# Patient Record
Sex: MALE | Race: WHITE | URBAN - METROPOLITAN AREA
[De-identification: names, ages, dates, MRNs, and addresses within clinical notes are randomized per-mention and may not be internally consistent; named-entity substitution may affect disease eponyms.]

---

## 2017-01-16 ENCOUNTER — HISTORICAL (OUTPATIENT)
Dept: ADMINISTRATIVE | Facility: HOSPITAL | Age: 62
End: 2017-01-16

## 2019-02-21 ENCOUNTER — HISTORICAL (OUTPATIENT)
Dept: ADMINISTRATIVE | Facility: HOSPITAL | Age: 64
End: 2019-02-21

## 2019-02-21 LAB
ALBUMIN SERPL-MCNC: 4.2 GM/DL (ref 3.4–5)
ALP SERPL-CCNC: 60 UNIT/L (ref 50–136)
ALT SERPL-CCNC: 35 UNIT/L (ref 12–78)
AST SERPL-CCNC: 23 UNIT/L (ref 15–37)
BILIRUB SERPL-MCNC: 0.5 MG/DL (ref 0.2–1)
BILIRUBIN DIRECT+TOT PNL SERPL-MCNC: 0.2 MG/DL (ref 0–0.2)
BILIRUBIN DIRECT+TOT PNL SERPL-MCNC: 0.3 MG/DL (ref 0–0.8)
CHOLEST SERPL-MCNC: 171 MG/DL (ref 0–200)
CHOLEST/HDLC SERPL: 4.5 {RATIO} (ref 0–5)
HDLC SERPL-MCNC: 38 MG/DL (ref 35–60)
LDLC SERPL CALC-MCNC: 100 MG/DL (ref 0–129)
LIVER PROFILE INTERP: NORMAL
PROT SERPL-MCNC: 7.4 GM/DL (ref 6.4–8.2)
TRIGL SERPL-MCNC: 165 MG/DL (ref 30–150)
VLDLC SERPL CALC-MCNC: 33 MG/DL

## 2025-07-25 ENCOUNTER — ANESTHESIA EVENT (OUTPATIENT)
Dept: SURGERY | Facility: HOSPITAL | Age: 70
End: 2025-07-25
Payer: MEDICARE

## 2025-07-25 ENCOUNTER — ANESTHESIA (OUTPATIENT)
Dept: SURGERY | Facility: HOSPITAL | Age: 70
End: 2025-07-25
Payer: MEDICARE

## 2025-07-25 ENCOUNTER — HOSPITAL ENCOUNTER (INPATIENT)
Facility: HOSPITAL | Age: 70
LOS: 6 days | Discharge: NURSING, REHAB OR LONG-TERM CARE | DRG: 481 | End: 2025-07-31
Attending: EMERGENCY MEDICINE | Admitting: INTERNAL MEDICINE
Payer: MEDICARE

## 2025-07-25 DIAGNOSIS — R07.9 CHEST PAIN: ICD-10-CM

## 2025-07-25 DIAGNOSIS — S72.142A INTERTROCHANTERIC FRACTURE OF LEFT FEMUR, CLOSED, INITIAL ENCOUNTER: Primary | ICD-10-CM

## 2025-07-25 DIAGNOSIS — Z01.818 PRE-OP EVALUATION: ICD-10-CM

## 2025-07-25 PROBLEM — D64.9 ANEMIA: Status: ACTIVE | Noted: 2025-07-25

## 2025-07-25 LAB
ALBUMIN SERPL-MCNC: 3.7 G/DL (ref 3.4–4.8)
ALBUMIN/GLOB SERPL: 1.2 RATIO (ref 1.1–2)
ALP SERPL-CCNC: 73 UNIT/L (ref 40–150)
ALT SERPL-CCNC: 21 UNIT/L (ref 0–55)
ANION GAP SERPL CALC-SCNC: 9 MEQ/L
APTT PPP: 24.8 SECONDS (ref 23.2–33.7)
AST SERPL-CCNC: 20 UNIT/L (ref 11–45)
BASOPHILS # BLD AUTO: 0.07 X10(3)/MCL
BASOPHILS NFR BLD AUTO: 0.4 %
BILIRUB SERPL-MCNC: 0.6 MG/DL
BUN SERPL-MCNC: 16 MG/DL (ref 8.4–25.7)
CALCIUM SERPL-MCNC: 8.6 MG/DL (ref 8.8–10)
CHLORIDE SERPL-SCNC: 102 MMOL/L (ref 98–107)
CO2 SERPL-SCNC: 26 MMOL/L (ref 23–31)
CREAT SERPL-MCNC: 0.89 MG/DL (ref 0.72–1.25)
CREAT/UREA NIT SERPL: 18
EOSINOPHIL # BLD AUTO: 0.25 X10(3)/MCL (ref 0–0.9)
EOSINOPHIL NFR BLD AUTO: 1.4 %
ERYTHROCYTE [DISTWIDTH] IN BLOOD BY AUTOMATED COUNT: 12.9 % (ref 11.5–17)
GFR SERPLBLD CREATININE-BSD FMLA CKD-EPI: >60 ML/MIN/1.73/M2
GLOBULIN SER-MCNC: 3.2 GM/DL (ref 2.4–3.5)
GLUCOSE SERPL-MCNC: 122 MG/DL (ref 82–115)
GROUP & RH: NORMAL
HCT VFR BLD AUTO: 42.6 % (ref 42–52)
HGB BLD-MCNC: 13.9 G/DL (ref 14–18)
IMM GRANULOCYTES # BLD AUTO: 0.11 X10(3)/MCL (ref 0–0.04)
IMM GRANULOCYTES NFR BLD AUTO: 0.6 %
INDIRECT COOMBS: NORMAL
INR PPP: 1
LYMPHOCYTES # BLD AUTO: 1.63 X10(3)/MCL (ref 0.6–4.6)
LYMPHOCYTES NFR BLD AUTO: 9.4 %
MCH RBC QN AUTO: 30.7 PG (ref 27–31)
MCHC RBC AUTO-ENTMCNC: 32.6 G/DL (ref 33–36)
MCV RBC AUTO: 94 FL (ref 80–94)
MONOCYTES # BLD AUTO: 0.59 X10(3)/MCL (ref 0.1–1.3)
MONOCYTES NFR BLD AUTO: 3.4 %
NEUTROPHILS # BLD AUTO: 14.74 X10(3)/MCL (ref 2.1–9.2)
NEUTROPHILS NFR BLD AUTO: 84.8 %
NRBC BLD AUTO-RTO: 0 %
OHS QRS DURATION: 88 MS
OHS QTC CALCULATION: 373 MS
PLATELET # BLD AUTO: 206 X10(3)/MCL (ref 130–400)
PMV BLD AUTO: 10.3 FL (ref 7.4–10.4)
POTASSIUM SERPL-SCNC: 4.4 MMOL/L (ref 3.5–5.1)
PROT SERPL-MCNC: 6.9 GM/DL (ref 5.8–7.6)
PROTHROMBIN TIME: 13.4 SECONDS (ref 12.5–14.5)
RBC # BLD AUTO: 4.53 X10(6)/MCL (ref 4.7–6.1)
SODIUM SERPL-SCNC: 137 MMOL/L (ref 136–145)
SPECIMEN OUTDATE: NORMAL
WBC # BLD AUTO: 17.39 X10(3)/MCL (ref 4.5–11.5)

## 2025-07-25 PROCEDURE — 37000009 HC ANESTHESIA EA ADD 15 MINS: Performed by: ORTHOPAEDIC SURGERY

## 2025-07-25 PROCEDURE — 27201423 OPTIME MED/SURG SUP & DEVICES STERILE SUPPLY: Performed by: ORTHOPAEDIC SURGERY

## 2025-07-25 PROCEDURE — 63600175 PHARM REV CODE 636 W HCPCS: Performed by: STUDENT IN AN ORGANIZED HEALTH CARE EDUCATION/TRAINING PROGRAM

## 2025-07-25 PROCEDURE — 25000003 PHARM REV CODE 250: Performed by: EMERGENCY MEDICINE

## 2025-07-25 PROCEDURE — 71000039 HC RECOVERY, EACH ADD'L HOUR: Performed by: ORTHOPAEDIC SURGERY

## 2025-07-25 PROCEDURE — 36000711: Performed by: ORTHOPAEDIC SURGERY

## 2025-07-25 PROCEDURE — 86900 BLOOD TYPING SEROLOGIC ABO: CPT | Performed by: ORTHOPAEDIC SURGERY

## 2025-07-25 PROCEDURE — 63600175 PHARM REV CODE 636 W HCPCS: Performed by: ORTHOPAEDIC SURGERY

## 2025-07-25 PROCEDURE — 27245 TREAT THIGH FRACTURE: CPT | Mod: LT,,, | Performed by: ORTHOPAEDIC SURGERY

## 2025-07-25 PROCEDURE — 85610 PROTHROMBIN TIME: CPT | Performed by: EMERGENCY MEDICINE

## 2025-07-25 PROCEDURE — 36000710: Performed by: ORTHOPAEDIC SURGERY

## 2025-07-25 PROCEDURE — 71000015 HC POSTOP RECOV 1ST HR: Performed by: ORTHOPAEDIC SURGERY

## 2025-07-25 PROCEDURE — 96375 TX/PRO/DX INJ NEW DRUG ADDON: CPT

## 2025-07-25 PROCEDURE — 0QS706Z REPOSITION LEFT UPPER FEMUR WITH INTRAMEDULLARY INTERNAL FIXATION DEVICE, OPEN APPROACH: ICD-10-PCS | Performed by: ORTHOPAEDIC SURGERY

## 2025-07-25 PROCEDURE — 99285 EMERGENCY DEPT VISIT HI MDM: CPT | Mod: 25

## 2025-07-25 PROCEDURE — 63600175 PHARM REV CODE 636 W HCPCS: Performed by: EMERGENCY MEDICINE

## 2025-07-25 PROCEDURE — 37000008 HC ANESTHESIA 1ST 15 MINUTES: Performed by: ORTHOPAEDIC SURGERY

## 2025-07-25 PROCEDURE — 11000001 HC ACUTE MED/SURG PRIVATE ROOM

## 2025-07-25 PROCEDURE — 63600175 PHARM REV CODE 636 W HCPCS

## 2025-07-25 PROCEDURE — 93010 ELECTROCARDIOGRAM REPORT: CPT | Mod: ,,, | Performed by: INTERNAL MEDICINE

## 2025-07-25 PROCEDURE — 93005 ELECTROCARDIOGRAM TRACING: CPT

## 2025-07-25 PROCEDURE — C1769 GUIDE WIRE: HCPCS | Performed by: ORTHOPAEDIC SURGERY

## 2025-07-25 PROCEDURE — 80053 COMPREHEN METABOLIC PANEL: CPT | Performed by: EMERGENCY MEDICINE

## 2025-07-25 PROCEDURE — 25000003 PHARM REV CODE 250: Performed by: PHYSICIAN ASSISTANT

## 2025-07-25 PROCEDURE — C1713 ANCHOR/SCREW BN/BN,TIS/BN: HCPCS | Performed by: ORTHOPAEDIC SURGERY

## 2025-07-25 PROCEDURE — 63600175 PHARM REV CODE 636 W HCPCS: Performed by: ANESTHESIOLOGY

## 2025-07-25 PROCEDURE — 71000016 HC POSTOP RECOV ADDL HR: Performed by: ORTHOPAEDIC SURGERY

## 2025-07-25 PROCEDURE — 96374 THER/PROPH/DIAG INJ IV PUSH: CPT

## 2025-07-25 PROCEDURE — 85730 THROMBOPLASTIN TIME PARTIAL: CPT | Performed by: EMERGENCY MEDICINE

## 2025-07-25 PROCEDURE — 25000003 PHARM REV CODE 250: Performed by: STUDENT IN AN ORGANIZED HEALTH CARE EDUCATION/TRAINING PROGRAM

## 2025-07-25 PROCEDURE — 25000003 PHARM REV CODE 250

## 2025-07-25 PROCEDURE — 96361 HYDRATE IV INFUSION ADD-ON: CPT

## 2025-07-25 PROCEDURE — 63600175 PHARM REV CODE 636 W HCPCS: Mod: JZ,TB | Performed by: ANESTHESIOLOGY

## 2025-07-25 PROCEDURE — 63600175 PHARM REV CODE 636 W HCPCS: Performed by: PHYSICIAN ASSISTANT

## 2025-07-25 PROCEDURE — 27245 TREAT THIGH FRACTURE: CPT | Mod: AS,LT,, | Performed by: PHYSICIAN ASSISTANT

## 2025-07-25 PROCEDURE — 71000033 HC RECOVERY, INTIAL HOUR: Performed by: ORTHOPAEDIC SURGERY

## 2025-07-25 PROCEDURE — 99223 1ST HOSP IP/OBS HIGH 75: CPT | Mod: 57,,, | Performed by: ORTHOPAEDIC SURGERY

## 2025-07-25 PROCEDURE — 85025 COMPLETE CBC W/AUTO DIFF WBC: CPT | Performed by: EMERGENCY MEDICINE

## 2025-07-25 DEVICE — IMPLANTABLE DEVICE: Type: IMPLANTABLE DEVICE | Site: HIP | Status: FUNCTIONAL

## 2025-07-25 DEVICE — NAIL IM CANN 130 DEG 11X400 L: Type: IMPLANTABLE DEVICE | Site: HIP | Status: FUNCTIONAL

## 2025-07-25 DEVICE — TFNA FENESTRATED HELICAL BLADE 95MM - STERILE
Type: IMPLANTABLE DEVICE | Site: HIP | Status: FUNCTIONAL
Brand: TFN-ADVANCE

## 2025-07-25 RX ORDER — MORPHINE SULFATE 4 MG/ML
2 INJECTION, SOLUTION INTRAMUSCULAR; INTRAVENOUS EVERY 6 HOURS PRN
Refills: 0 | Status: DISCONTINUED | OUTPATIENT
Start: 2025-07-25 | End: 2025-07-25 | Stop reason: SDUPTHER

## 2025-07-25 RX ORDER — PROPOFOL 10 MG/ML
VIAL (ML) INTRAVENOUS
Status: DISCONTINUED | OUTPATIENT
Start: 2025-07-25 | End: 2025-07-25

## 2025-07-25 RX ORDER — PRAVASTATIN SODIUM 20 MG/1
TABLET ORAL
Status: ON HOLD | COMMUNITY

## 2025-07-25 RX ORDER — POLYETHYLENE GLYCOL 3350 17 G/17G
17 POWDER, FOR SOLUTION ORAL 3 TIMES DAILY PRN
Status: DISCONTINUED | OUTPATIENT
Start: 2025-07-25 | End: 2025-07-31 | Stop reason: HOSPADM

## 2025-07-25 RX ORDER — CEFAZOLIN SODIUM 1 G/3ML
INJECTION, POWDER, FOR SOLUTION INTRAMUSCULAR; INTRAVENOUS
Status: DISCONTINUED | OUTPATIENT
Start: 2025-07-25 | End: 2025-07-25

## 2025-07-25 RX ORDER — LOSARTAN POTASSIUM 50 MG/1
50 TABLET ORAL
Status: ON HOLD | COMMUNITY
Start: 2025-04-27 | End: 2025-07-30 | Stop reason: HOSPADM

## 2025-07-25 RX ORDER — IBUPROFEN 200 MG
1 TABLET ORAL DAILY PRN
Status: DISCONTINUED | OUTPATIENT
Start: 2025-07-25 | End: 2025-07-31 | Stop reason: HOSPADM

## 2025-07-25 RX ORDER — PRAVASTATIN SODIUM 10 MG/1
20 TABLET ORAL DAILY
Status: DISCONTINUED | OUTPATIENT
Start: 2025-07-25 | End: 2025-07-31 | Stop reason: HOSPADM

## 2025-07-25 RX ORDER — MORPHINE SULFATE 4 MG/ML
4 INJECTION, SOLUTION INTRAMUSCULAR; INTRAVENOUS EVERY 6 HOURS PRN
Refills: 0 | Status: DISCONTINUED | OUTPATIENT
Start: 2025-07-25 | End: 2025-07-30

## 2025-07-25 RX ORDER — VANCOMYCIN HYDROCHLORIDE 1 G/20ML
INJECTION, POWDER, LYOPHILIZED, FOR SOLUTION INTRAVENOUS
Status: DISCONTINUED | OUTPATIENT
Start: 2025-07-25 | End: 2025-07-25 | Stop reason: HOSPADM

## 2025-07-25 RX ORDER — ONDANSETRON HYDROCHLORIDE 2 MG/ML
4 INJECTION, SOLUTION INTRAVENOUS EVERY 8 HOURS PRN
Status: DISCONTINUED | OUTPATIENT
Start: 2025-07-25 | End: 2025-07-25

## 2025-07-25 RX ORDER — NALOXONE HCL 0.4 MG/ML
0.02 VIAL (ML) INJECTION
Status: DISCONTINUED | OUTPATIENT
Start: 2025-07-25 | End: 2025-07-31 | Stop reason: HOSPADM

## 2025-07-25 RX ORDER — KETOROLAC TROMETHAMINE 30 MG/ML
15 INJECTION, SOLUTION INTRAMUSCULAR; INTRAVENOUS EVERY 8 HOURS PRN
Status: DISCONTINUED | OUTPATIENT
Start: 2025-07-25 | End: 2025-07-25

## 2025-07-25 RX ORDER — ENOXAPARIN SODIUM 100 MG/ML
40 INJECTION SUBCUTANEOUS EVERY 24 HOURS
Status: DISCONTINUED | OUTPATIENT
Start: 2025-07-25 | End: 2025-07-31 | Stop reason: HOSPADM

## 2025-07-25 RX ORDER — LIDOCAINE HYDROCHLORIDE 20 MG/ML
INJECTION, SOLUTION EPIDURAL; INFILTRATION; INTRACAUDAL; PERINEURAL
Status: DISCONTINUED | OUTPATIENT
Start: 2025-07-25 | End: 2025-07-25

## 2025-07-25 RX ORDER — ACETAMINOPHEN 325 MG/1
650 TABLET ORAL EVERY 4 HOURS PRN
Status: DISCONTINUED | OUTPATIENT
Start: 2025-07-25 | End: 2025-07-31 | Stop reason: HOSPADM

## 2025-07-25 RX ORDER — TALC
9 POWDER (GRAM) TOPICAL NIGHTLY PRN
Status: DISCONTINUED | OUTPATIENT
Start: 2025-07-25 | End: 2025-07-31 | Stop reason: HOSPADM

## 2025-07-25 RX ORDER — ACETAMINOPHEN 500 MG
1000 TABLET ORAL EVERY 6 HOURS PRN
Status: DISCONTINUED | OUTPATIENT
Start: 2025-07-25 | End: 2025-07-25

## 2025-07-25 RX ORDER — TIZANIDINE 4 MG/1
4 TABLET ORAL EVERY 8 HOURS PRN
Status: DISCONTINUED | OUTPATIENT
Start: 2025-07-25 | End: 2025-07-30

## 2025-07-25 RX ORDER — ONDANSETRON HYDROCHLORIDE 2 MG/ML
4 INJECTION, SOLUTION INTRAVENOUS EVERY 8 HOURS PRN
Status: DISCONTINUED | OUTPATIENT
Start: 2025-07-25 | End: 2025-07-31 | Stop reason: HOSPADM

## 2025-07-25 RX ORDER — CEFAZOLIN SODIUM 2 G/50ML
2 SOLUTION INTRAVENOUS
Status: DISCONTINUED | OUTPATIENT
Start: 2025-07-25 | End: 2025-07-25 | Stop reason: HOSPADM

## 2025-07-25 RX ORDER — HYDRALAZINE HYDROCHLORIDE 20 MG/ML
10 INJECTION INTRAMUSCULAR; INTRAVENOUS ONCE
Status: DISCONTINUED | OUTPATIENT
Start: 2025-07-25 | End: 2025-07-25

## 2025-07-25 RX ORDER — DEXAMETHASONE SODIUM PHOSPHATE 4 MG/ML
INJECTION, SOLUTION INTRA-ARTICULAR; INTRALESIONAL; INTRAMUSCULAR; INTRAVENOUS; SOFT TISSUE
Status: DISCONTINUED | OUTPATIENT
Start: 2025-07-25 | End: 2025-07-25

## 2025-07-25 RX ORDER — HALOPERIDOL LACTATE 5 MG/ML
0.5 INJECTION, SOLUTION INTRAMUSCULAR EVERY 10 MIN PRN
Status: DISCONTINUED | OUTPATIENT
Start: 2025-07-25 | End: 2025-07-25

## 2025-07-25 RX ORDER — ROCURONIUM BROMIDE 10 MG/ML
INJECTION, SOLUTION INTRAVENOUS
Status: DISCONTINUED | OUTPATIENT
Start: 2025-07-25 | End: 2025-07-25

## 2025-07-25 RX ORDER — IBUPROFEN 200 MG
16 TABLET ORAL
Status: DISCONTINUED | OUTPATIENT
Start: 2025-07-25 | End: 2025-07-31 | Stop reason: HOSPADM

## 2025-07-25 RX ORDER — SODIUM CHLORIDE 0.9 % (FLUSH) 0.9 %
10 SYRINGE (ML) INJECTION
Status: DISCONTINUED | OUTPATIENT
Start: 2025-07-25 | End: 2025-07-25

## 2025-07-25 RX ORDER — FENTANYL CITRATE 50 UG/ML
INJECTION, SOLUTION INTRAMUSCULAR; INTRAVENOUS
Status: DISCONTINUED | OUTPATIENT
Start: 2025-07-25 | End: 2025-07-25

## 2025-07-25 RX ORDER — HYDROMORPHONE HYDROCHLORIDE 2 MG/ML
INJECTION, SOLUTION INTRAMUSCULAR; INTRAVENOUS; SUBCUTANEOUS
Status: DISCONTINUED | OUTPATIENT
Start: 2025-07-25 | End: 2025-07-25

## 2025-07-25 RX ORDER — IBUPROFEN 100 MG/5ML
SUSPENSION, ORAL (FINAL DOSE FORM) ORAL
Status: ON HOLD | COMMUNITY

## 2025-07-25 RX ORDER — SIMETHICONE 80 MG
1 TABLET,CHEWABLE ORAL 4 TIMES DAILY PRN
Status: DISCONTINUED | OUTPATIENT
Start: 2025-07-25 | End: 2025-07-31 | Stop reason: HOSPADM

## 2025-07-25 RX ORDER — ONDANSETRON HYDROCHLORIDE 2 MG/ML
INJECTION, SOLUTION INTRAVENOUS
Status: DISCONTINUED | OUTPATIENT
Start: 2025-07-25 | End: 2025-07-25

## 2025-07-25 RX ORDER — SODIUM CHLORIDE 0.9 % (FLUSH) 0.9 %
10 SYRINGE (ML) INJECTION
Status: DISCONTINUED | OUTPATIENT
Start: 2025-07-25 | End: 2025-07-31 | Stop reason: HOSPADM

## 2025-07-25 RX ORDER — IPRATROPIUM BROMIDE AND ALBUTEROL SULFATE 2.5; .5 MG/3ML; MG/3ML
3 SOLUTION RESPIRATORY (INHALATION) EVERY 6 HOURS PRN
Status: DISCONTINUED | OUTPATIENT
Start: 2025-07-25 | End: 2025-07-31 | Stop reason: HOSPADM

## 2025-07-25 RX ORDER — ACETAMINOPHEN 10 MG/ML
1000 INJECTION, SOLUTION INTRAVENOUS ONCE
Status: COMPLETED | OUTPATIENT
Start: 2025-07-25 | End: 2025-07-25

## 2025-07-25 RX ORDER — GLUCAGON 1 MG
1 KIT INJECTION
Status: DISCONTINUED | OUTPATIENT
Start: 2025-07-25 | End: 2025-07-31 | Stop reason: HOSPADM

## 2025-07-25 RX ORDER — BISACODYL 10 MG/1
10 SUPPOSITORY RECTAL DAILY PRN
Status: DISCONTINUED | OUTPATIENT
Start: 2025-07-25 | End: 2025-07-31 | Stop reason: HOSPADM

## 2025-07-25 RX ORDER — CEFAZOLIN 2 G/1
2 INJECTION, POWDER, FOR SOLUTION INTRAMUSCULAR; INTRAVENOUS
Status: COMPLETED | OUTPATIENT
Start: 2025-07-25 | End: 2025-07-26

## 2025-07-25 RX ORDER — GLUCAGON 1 MG
1 KIT INJECTION
Status: DISCONTINUED | OUTPATIENT
Start: 2025-07-25 | End: 2025-07-25

## 2025-07-25 RX ORDER — HYDROMORPHONE HYDROCHLORIDE 2 MG/ML
0.2 INJECTION, SOLUTION INTRAMUSCULAR; INTRAVENOUS; SUBCUTANEOUS EVERY 5 MIN PRN
Status: DISCONTINUED | OUTPATIENT
Start: 2025-07-25 | End: 2025-07-25

## 2025-07-25 RX ORDER — LOSARTAN POTASSIUM 50 MG/1
50 TABLET ORAL DAILY
Status: DISCONTINUED | OUTPATIENT
Start: 2025-07-25 | End: 2025-07-30

## 2025-07-25 RX ORDER — ALUMINUM HYDROXIDE, MAGNESIUM HYDROXIDE, AND SIMETHICONE 1200; 120; 1200 MG/30ML; MG/30ML; MG/30ML
30 SUSPENSION ORAL 4 TIMES DAILY PRN
Status: DISCONTINUED | OUTPATIENT
Start: 2025-07-25 | End: 2025-07-31 | Stop reason: HOSPADM

## 2025-07-25 RX ORDER — ONDANSETRON 4 MG/1
8 TABLET, ORALLY DISINTEGRATING ORAL EVERY 8 HOURS PRN
Status: DISCONTINUED | OUTPATIENT
Start: 2025-07-25 | End: 2025-07-31 | Stop reason: HOSPADM

## 2025-07-25 RX ORDER — GLUCOSAM/CHONDRO/HERB 149/HYAL 750-100 MG
1 TABLET ORAL
Status: ON HOLD | COMMUNITY

## 2025-07-25 RX ORDER — IBUPROFEN 200 MG
24 TABLET ORAL
Status: DISCONTINUED | OUTPATIENT
Start: 2025-07-25 | End: 2025-07-31 | Stop reason: HOSPADM

## 2025-07-25 RX ORDER — OXYCODONE AND ACETAMINOPHEN 5; 325 MG/1; MG/1
1 TABLET ORAL
Status: DISCONTINUED | OUTPATIENT
Start: 2025-07-25 | End: 2025-07-25

## 2025-07-25 RX ORDER — HYDROCODONE BITARTRATE AND ACETAMINOPHEN 5; 325 MG/1; MG/1
1 TABLET ORAL EVERY 6 HOURS PRN
Refills: 0 | Status: DISCONTINUED | OUTPATIENT
Start: 2025-07-25 | End: 2025-07-25 | Stop reason: SDUPTHER

## 2025-07-25 RX ORDER — OXYCODONE HYDROCHLORIDE 10 MG/1
10 TABLET ORAL EVERY 4 HOURS PRN
Refills: 0 | Status: DISCONTINUED | OUTPATIENT
Start: 2025-07-25 | End: 2025-07-30

## 2025-07-25 RX ORDER — SODIUM CHLORIDE 9 MG/ML
500 INJECTION, SOLUTION INTRAVENOUS
Status: COMPLETED | OUTPATIENT
Start: 2025-07-25 | End: 2025-07-25

## 2025-07-25 RX ORDER — METHOCARBAMOL 100 MG/ML
1000 INJECTION, SOLUTION INTRAMUSCULAR; INTRAVENOUS ONCE
Status: COMPLETED | OUTPATIENT
Start: 2025-07-25 | End: 2025-07-25

## 2025-07-25 RX ORDER — OXYCODONE HYDROCHLORIDE 5 MG/1
5 TABLET ORAL EVERY 6 HOURS PRN
Refills: 0 | Status: DISCONTINUED | OUTPATIENT
Start: 2025-07-25 | End: 2025-07-26

## 2025-07-25 RX ORDER — MORPHINE SULFATE 4 MG/ML
4 INJECTION, SOLUTION INTRAMUSCULAR; INTRAVENOUS
Status: COMPLETED | OUTPATIENT
Start: 2025-07-25 | End: 2025-07-25

## 2025-07-25 RX ORDER — GLYCOPYRROLATE 0.2 MG/ML
INJECTION INTRAMUSCULAR; INTRAVENOUS
Status: DISCONTINUED | OUTPATIENT
Start: 2025-07-25 | End: 2025-07-25

## 2025-07-25 RX ADMIN — OXYCODONE HYDROCHLORIDE 5 MG: 5 TABLET ORAL at 06:07

## 2025-07-25 RX ADMIN — SODIUM CHLORIDE 500 ML: 9 INJECTION, SOLUTION INTRAVENOUS at 04:07

## 2025-07-25 RX ADMIN — ROCURONIUM BROMIDE 50 MG: 10 SOLUTION INTRAVENOUS at 11:07

## 2025-07-25 RX ADMIN — METHOCARBAMOL 1000 MG: 100 INJECTION INTRAMUSCULAR; INTRAVENOUS at 04:07

## 2025-07-25 RX ADMIN — LIDOCAINE HYDROCHLORIDE 80 MG: 20 INJECTION, SOLUTION EPIDURAL; INFILTRATION; INTRACAUDAL; PERINEURAL at 11:07

## 2025-07-25 RX ADMIN — OXYCODONE HYDROCHLORIDE 10 MG: 10 TABLET ORAL at 10:07

## 2025-07-25 RX ADMIN — HYDROCODONE BITARTRATE AND ACETAMINOPHEN 1 TABLET: 5; 325 TABLET ORAL at 07:07

## 2025-07-25 RX ADMIN — HYDROMORPHONE HYDROCHLORIDE 1 MG: 2 INJECTION, SOLUTION INTRAMUSCULAR; INTRAVENOUS; SUBCUTANEOUS at 12:07

## 2025-07-25 RX ADMIN — CEFAZOLIN 2 G: 330 INJECTION, POWDER, FOR SOLUTION INTRAMUSCULAR; INTRAVENOUS at 11:07

## 2025-07-25 RX ADMIN — ACETAMINOPHEN 1000 MG: 10 INJECTION, SOLUTION INTRAVENOUS at 01:07

## 2025-07-25 RX ADMIN — MORPHINE SULFATE 4 MG: 4 INJECTION INTRAVENOUS at 04:07

## 2025-07-25 RX ADMIN — SUGAMMADEX 200 MG: 100 INJECTION, SOLUTION INTRAVENOUS at 12:07

## 2025-07-25 RX ADMIN — FENTANYL CITRATE 100 MCG: 50 INJECTION, SOLUTION INTRAMUSCULAR; INTRAVENOUS at 11:07

## 2025-07-25 RX ADMIN — CEFAZOLIN 2 G: 2 INJECTION, POWDER, FOR SOLUTION INTRAMUSCULAR; INTRAVENOUS at 06:07

## 2025-07-25 RX ADMIN — PROPOFOL 150 MG: 10 INJECTION, EMULSION INTRAVENOUS at 11:07

## 2025-07-25 RX ADMIN — ENOXAPARIN SODIUM 40 MG: 40 INJECTION SUBCUTANEOUS at 04:07

## 2025-07-25 RX ADMIN — KETOROLAC TROMETHAMINE 15 MG: 30 INJECTION, SOLUTION INTRAMUSCULAR at 01:07

## 2025-07-25 RX ADMIN — GLYCOPYRROLATE 0.1 MG: 0.2 INJECTION INTRAMUSCULAR; INTRAVENOUS at 11:07

## 2025-07-25 RX ADMIN — SODIUM CHLORIDE 500 ML: 9 INJECTION, SOLUTION INTRAVENOUS at 05:07

## 2025-07-25 RX ADMIN — HYDROMORPHONE HYDROCHLORIDE 1 MG: 2 INJECTION, SOLUTION INTRAMUSCULAR; INTRAVENOUS; SUBCUTANEOUS at 11:07

## 2025-07-25 RX ADMIN — ONDANSETRON 4 MG: 2 INJECTION INTRAMUSCULAR; INTRAVENOUS at 11:07

## 2025-07-25 RX ADMIN — DEXAMETHASONE SODIUM PHOSPHATE 8 MG: 4 INJECTION, SOLUTION INTRA-ARTICULAR; INTRALESIONAL; INTRAMUSCULAR; INTRAVENOUS; SOFT TISSUE at 11:07

## 2025-07-25 RX ADMIN — SODIUM CHLORIDE, SODIUM GLUCONATE, SODIUM ACETATE, POTASSIUM CHLORIDE AND MAGNESIUM CHLORIDE: 526; 502; 368; 37; 30 INJECTION, SOLUTION INTRAVENOUS at 11:07

## 2025-07-25 NOTE — ANESTHESIA PREPROCEDURE EVALUATION
"                                                                                                             07/25/2025  Panfilo Tolentino is a 69 y.o., male w/ h/o HTN and HLD presenting to [facility] today with a L intertrochanteric proximal femur fracture, to be repaired by Dr. Herring.         Height: 6' 2" (1.88 m) (07/25/25)   Weight: 98.9 kg (218 lb) (07/25/25)   BMI: 28 (07/25/25)   NPO Status: Not recorded   Allergies: No Known Allergies     Pre Vitals  Current as of 07/25/25 0809  BP: Pulse:   Resp: 23 SpO2:   Temp:     Past Medical History   Hypertension Mixed hyperlipidemia     Surgical History    VASCULAR SURGERY     Prescription Medications  Within last 14 days from 07/25/25   Last Taken Last Updated   ascorbic acid, vitamin C, (VITAMIN C) 1000 MG tablet        losartan (COZAAR) 50 MG tablet    7/24/2025 07/25/25 0553   mv-min-folic-K1-lycopen-lutein 756--300 mcg Tab        omega 3-dha-epa-fish oil (FISH OIL) 1,000 (120-180) mg Cap        pravastatin (PRAVACHOL) 20 MG tablet    7/24/2025 07/25/25 0553      Latest Reference Range & Units 07/25/25 05:02   WBC 4.50 - 11.50 x10(3)/mcL 17.39 (H)   RBC 4.70 - 6.10 x10(6)/mcL 4.53 (L)   Hemoglobin 14.0 - 18.0 g/dL 13.9 (L)   Hematocrit 42.0 - 52.0 % 42.6   Platelet Count 130 - 400 x10(3)/mcL 206   PT 12.5 - 14.5 seconds 13.4   INR <=1.3  1.0   PTT 23.2 - 33.7 seconds 24.8   Sodium 136 - 145 mmol/L 137   Potassium 3.5 - 5.1 mmol/L 4.4   Chloride 98 - 107 mmol/L 102   CO2 23 - 31 mmol/L 26   Anion Gap mEq/L 9.0   BUN 8.4 - 25.7 mg/dL 16.0   Creatinine 0.72 - 1.25 mg/dL 0.89   BUN/CREAT RATIO  18   eGFR mL/min/1.73/m2 >60   Glucose 82 - 115 mg/dL 122 (H)   Calcium 8.8 - 10.0 mg/dL 8.6 (L)       Pre-op Assessment    I have reviewed the Patient Summary Reports.     I have reviewed the Nursing Notes. I have reviewed the NPO Status.   I have reviewed the Medications.     Review of Systems  Anesthesia Hx:  No problems with previous Anesthesia   History of prior " surgery of interest to airway management or planning:  Previous anesthesia: General, MAC        Denies Family Hx of Anesthesia complications.    Denies Personal Hx of Anesthesia complications.                    Social:  Smoker, Alcohol Use       Hematology/Oncology:    Oncology Normal    -- Denies Anemia:                                  EENT/Dental:  EENT/Dental Normal           Cardiovascular:  Exercise tolerance: good   Hypertension       Denies Angina.                              Hypertension         Pulmonary:     Denies Asthma.   Denies Shortness of breath.   Denies Sleep Apnea.                Renal/:   Denies Chronic Renal Disease.                Hepatic/GI:      Denies GERD.    Not Taking GLP-1 Agonists            Musculoskeletal:  Musculoskeletal Normal                Neurological:    Denies CVA.                                    Endocrine:  Denies Diabetes.         Denies Obesity / BMI > 30  Dermatological:  Skin Normal    Psych:  Psychiatric Normal                    Physical Exam  General: Well nourished, Cooperative, Alert and Oriented    Airway:  Mallampati: II / I  Mouth Opening: Normal  TM Distance: Normal  Tongue: Normal  Neck ROM: Normal ROM    Dental:  Intact    Chest/Lungs:  Clear to auscultation, Normal Respiratory Rate    Heart:  Rate: Normal  Rhythm: Regular Rhythm  Sounds: Normal    Abdomen:  Normal, Soft, Nontender        Anesthesia Plan  Type of Anesthesia, risks & benefits discussed:    Anesthesia Type: Gen ETT  Intra-op Monitoring Plan: Standard ASA Monitors  Post Op Pain Control Plan: multimodal analgesia and IV/PO Opioids PRN  Induction:  IV  Airway Plan: Direct, Post-Induction  Informed Consent: Informed consent signed with the Patient and all parties understand the risks and agree with anesthesia plan.  All questions answered. Patient consented to blood products? Yes  ASA Score: 2 Emergent  Day of Surgery Review of History & Physical: H&P Update referred to the  surgeon/provider.    Ready For Surgery From Anesthesia Perspective.     .

## 2025-07-25 NOTE — TRANSFER OF CARE
"Anesthesia Transfer of Care Note    Patient: Panfilo Tolentino    Procedure(s) Performed: Procedure(s) (LRB):  INSERTION, INTRAMEDULLARY IMPLANT, FEMUR (Left)    Patient location: PACU    Anesthesia Type: general    Transport from OR: Transported from OR on room air with adequate spontaneous ventilation    Post pain: adequate analgesia    Post assessment: no apparent anesthetic complications    Post vital signs: stable    Level of consciousness: responds to stimulation    Nausea/Vomiting: no nausea/vomiting    Complications: none    Transfer of care protocol was followed      Last vitals: Visit Vitals  BP (!) 150/79 (BP Location: Right arm, Patient Position: Lying)   Pulse 66   Temp 36.7 °C (98 °F) (Oral)   Resp 18   Ht 6' 2" (1.88 m)   Wt 98.9 kg (218 lb)   SpO2 97%   BMI 27.99 kg/m²     "

## 2025-07-25 NOTE — ED PROVIDER NOTES
Encounter Date: 7/25/2025       History     Chief Complaint   Patient presents with    Fall     Pt arrives via [facility] for a slip and fall landed on left hip, left leg is externally rotated and has pain to left hip. PMS intact. No LOC no BT      69-year-old male with history of hypertension, hyperlipidemia presents for evaluation after a mechanical fall.  He states he was walking in the dark in his house prior to arrival, was not able to see clearly objects in his path, tripped and fell landing on his left side.  He did not strike his head.  No LOC.  He is not on anticoagulation therapy.  He was not able to ambulate since the incident.  EMS transported him here for evaluation.  He did not receive any pain medication.  He did drink 3 beers last night.  No neck or back pain reported.  He has otherwise been in his usual state of health.    The history is provided by the patient.     Review of patient's allergies indicates:  No Known Allergies  Past Medical History:   Diagnosis Date    Hypertension     Mixed hyperlipidemia      Past Surgical History:   Procedure Laterality Date    VASCULAR SURGERY       No family history on file.  Social History[1]  Review of Systems   Constitutional:  Negative for fever and unexpected weight change.   Eyes:  Negative for visual disturbance.   Respiratory:  Negative for cough and shortness of breath.    Cardiovascular:  Negative for chest pain.   Gastrointestinal:  Negative for abdominal pain, blood in stool, diarrhea, nausea and vomiting.   Genitourinary:  Negative for difficulty urinating.   Musculoskeletal:  Positive for arthralgias. Negative for back pain and neck pain.   Skin:  Negative for wound.   Neurological:  Negative for dizziness, weakness, light-headedness, numbness and headaches.       Physical Exam     Initial Vitals [07/25/25 0307]   BP Pulse Resp Temp SpO2   124/60 (!) 59 18 98 °F (36.7 °C) 98 %      MAP       --         Physical Exam    Nursing note and vitals  reviewed.  Constitutional: He appears well-developed and well-nourished. He is not diaphoretic. No distress.   HENT:   Head: Normocephalic and atraumatic. Mouth/Throat: Oropharynx is clear and moist.   Eyes: Conjunctivae and EOM are normal. Pupils are equal, round, and reactive to light.   Neck: Neck supple.   Normal range of motion.  Cardiovascular:  Normal rate, regular rhythm and intact distal pulses.           2+ radial and DP pulses bilaterally   Pulmonary/Chest: Breath sounds normal. No respiratory distress. He has no wheezes. He has no rhonchi. He has no rales.   Abdominal: Abdomen is soft. Bowel sounds are normal. He exhibits no distension. There is no abdominal tenderness.   Musculoskeletal:         General: Normal range of motion.      Cervical back: Normal range of motion and neck supple.      Comments: Swelling and tenderness over the left thigh noted with shortening and rotation of the left lower extremity.  He is able to wiggle his toes, sensation is intact to light touch in the lower extremities bilaterally.  No open skin is noted about the left hip.  Compartments in the thigh are soft.  No C/T/L spine TTP, step-off or deformity       Neurological: He is alert and oriented to person, place, and time. He has normal strength. No cranial nerve deficit or sensory deficit. GCS score is 15. GCS eye subscore is 4. GCS verbal subscore is 5. GCS motor subscore is 6.   Skin: Skin is warm and dry. Capillary refill takes less than 2 seconds.   Psychiatric: He has a normal mood and affect.         ED Course   Procedures  Labs Reviewed   COMPREHENSIVE METABOLIC PANEL - Abnormal       Result Value    Sodium 137      Potassium 4.4      Chloride 102      CO2 26      Glucose 122 (*)     Blood Urea Nitrogen 16.0      Creatinine 0.89      Calcium 8.6 (*)     Protein Total 6.9      Albumin 3.7      Globulin 3.2      Albumin/Globulin Ratio 1.2      Bilirubin Total 0.6      ALP 73      ALT 21      AST 20      eGFR >60       Anion Gap 9.0      BUN/Creatinine Ratio 18     CBC WITH DIFFERENTIAL - Abnormal    WBC 17.39 (*)     RBC 4.53 (*)     Hgb 13.9 (*)     Hct 42.6      MCV 94.0      MCH 30.7      MCHC 32.6 (*)     RDW 12.9      Platelet 206      MPV 10.3      Neut % 84.8      Lymph % 9.4      Mono % 3.4      Eos % 1.4      Basophil % 0.4      Imm Grans % 0.6      Neut # 14.74 (*)     Lymph # 1.63      Mono # 0.59      Eos # 0.25      Baso # 0.07      Imm Gran # 0.11 (*)     NRBC% 0.0     PROTIME-INR - Normal    PT 13.4      INR 1.0      Narrative:     Protimes are used to monitor anticoagulant agents such as warfarin. PT INR values are based on the current patient normal mean and the NATY value for the specific instrument reagent used.  **Routine theraputic target values for the INR are 2.0-3.0**   APTT - Normal    PTT 24.8     CBC W/ AUTO DIFFERENTIAL    Narrative:     The following orders were created for panel order CBC auto differential.  Procedure                               Abnormality         Status                     ---------                               -----------         ------                     CBC with Differential[[id number]]       Abnormal            Final result                 Please view results for these tests on the individual orders.   TYPE & SCREEN    Group & Rh O POS      Indirect Yoni GEL NEG      Specimen Outdate 07/28/2025 23:59       EKG Readings: (Independently Interpreted)   Initial Reading: No STEMI. Rhythm: Sinus Bradycardia. Heart Rate: 58. Ectopy: No Ectopy. Conduction: Normal. ST Segments: Normal ST Segments. T Waves: Normal.   0454     ECG Results              EKG 12-lead (Final result)        Collection Time Result Time QRS Duration OHS QTC Calculation    07/25/25 04:54:35 07/25/25 09:01:35 88 373                     Final result by Interface, Lab In [facility] (07/25/25 09:01:41)                   Narrative:    Test Reason : Z01.818,    Vent. Rate :  58 BPM     Atrial Rate :  58 BPM     P-R  Int : 198 ms          QRS Dur :  88 ms      QT Int : 380 ms       P-R-T Axes :  65  49  64 degrees    QTcB Int : 373 ms    Sinus bradycardia  Otherwise normal ECG  When compared with ECG of 04-May-1993 15:44,  No significant change was found  Confirmed by Jeffrey Tse ([id number]) on 7/25/2025 9:01:32 AM    Referred By:            Confirmed By: Jeffrey Tse                                  Imaging Results              X-Ray Chest 1 View (Final result)  Result time 07/25/25 09:14:19      Final result by Irving Lilly MD (07/25/25 09:14:19)                   Impression:      No acute cardiopulmonary process identified.      Electronically signed by: Irving Lilly  Date:    07/25/2025  Time:    09:14               Narrative:    EXAMINATION:  XR CHEST 1 VIEW    CLINICAL HISTORY:  preop;    TECHNIQUE:  One view    COMPARISON:  None available.    FINDINGS:  Cardiopericardial silhouette is within normal limits.  No acute dense focal or segmental consolidation, congestive process, pleural effusions or pneumothorax.                                       X-Ray Hip 2 or 3 views Left with Pelvis when performed (In process)                   X-Rays:   Independently Interpreted Readings:   Chest X-Ray: No infiltrates.   Other Readings:  Xr left hip significant for comminuted, displaced left intertrochanteric fracture    Medications   sodium chloride 0.9% flush 10 mL (has no administration in time range)   naloxone 0.4 mg/mL injection 0.02 mg (has no administration in time range)   glucose chewable tablet 16 g (has no administration in time range)   glucose chewable tablet 24 g (has no administration in time range)   dextrose 50% injection 12.5 g (has no administration in time range)   dextrose 50% injection 25 g (has no administration in time range)   glucagon (human recombinant) injection 1 mg (has no administration in time range)   losartan tablet 50 mg (50 mg Oral Not Given 7/25/25 0900)   pravastatin tablet 20 mg (20 mg Oral  Not Given 7/25/25 0900)   albuterol-ipratropium 2.5 mg-0.5 mg/3 mL nebulizer solution 3 mL (has no administration in time range)   melatonin tablet 9 mg (has no administration in time range)   ondansetron disintegrating tablet 8 mg (has no administration in time range)   ondansetron injection 4 mg (has no administration in time range)   polyethylene glycol packet 17 g (has no administration in time range)   bisacodyL suppository 10 mg (has no administration in time range)   simethicone chewable tablet 80 mg (has no administration in time range)   aluminum-magnesium hydroxide-simethicone 200-200-20 mg/5 mL suspension 30 mL (has no administration in time range)   acetaminophen tablet 650 mg (has no administration in time range)   HYDROcodone-acetaminophen 5-325 mg per tablet 1 tablet (1 tablet Oral Given 7/25/25 0758)   morphine injection 2 mg (has no administration in time range)   cefazolin (ANCEF) 2 gram in dextrose 5% 50 mL IVPB (premix) (has no administration in time range)   nicotine 21 mg/24 hr 1 patch (has no administration in time range)   enoxaparin injection 40 mg (has no administration in time range)   morphine injection 4 mg (4 mg Intravenous Given 7/25/25 0454)   methocarbamoL injection 1,000 mg (1,000 mg Intravenous Given 7/25/25 0454)   0.9% NaCl infusion (0 mLs Intravenous Stopped 7/25/25 0528)   sodium chloride 0.9% bolus 500 mL 500 mL (0 mLs Intravenous Stopped 7/25/25 0615)     Medical Decision Making  69-year-old male presents after a mechanical ground level fall for left hip pain.  No head trauma, LOC, neck or back pain.  He is afebrile, hemodynamically stable, oxygenating well on room air.  GCS 15, NVI.  Left lower extremity is rotated, short, compartments soft at this time.  No open skin.  X-rays concerning for intertrochanteric fracture.  Orthopedic surgery will be consulted.  Preoperative screening labs, chest x-ray and EKG will be ordered.  Results and plan of care discussed with patient and  "his family.    Differential diagnosis includes but isn't limited to hip fracture, dislocation, contusion.    Problems Addressed:  Intertrochanteric fracture of left femur, closed, initial encounter: acute illness or injury that poses a threat to life or bodily functions    Amount and/or Complexity of Data Reviewed  Labs: ordered. Decision-making details documented in ED Course.  Radiology: ordered and independent interpretation performed. Decision-making details documented in ED Course.  ECG/medicine tests: ordered and independent interpretation performed. Decision-making details documented in ED Course.    Risk  Decision regarding hospitalization.               ED Course as of 07/25/25 1036   Fri Jul 25, 2025   0558 Dr. Braden Herring contacted and will evaluate the patient. [facility] medicine to admit.   [RB]      ED Course User Index  [RB] Vani Garcia MD                 /71   Pulse 66   Temp 98 °F (36.7 °C) (Oral)   Resp 16   Ht 6' 2" (1.88 m)   Wt 98.9 kg (218 lb)   SpO2 96%   BMI 27.99 kg/m²                 Clinical Impression:  Final diagnoses:  [Z01.818] Pre-op evaluation  [S72.142A] Intertrochanteric fracture of left femur, closed, initial encounter (Primary)          ED Disposition Condition    Admit                       [1]   Social History  Tobacco Use    Smoking status: Every Day     Current packs/day: 1.00     Types: Cigarettes    Smokeless tobacco: Never   Substance Use Topics    Alcohol use: Yes    Drug use: Not Currently        Vani Garcia MD  07/25/25 1036    "

## 2025-07-25 NOTE — ANESTHESIA PROCEDURE NOTES
Intubation    Date/Time: 7/25/2025 11:19 AM    Performed by: Libra Rosa CRNA  Authorized by: Melvin Guzman MD    Intubation:     Induction:  Intravenous    Intubated:  Postinduction    Mask Ventilation:  Easy mask    Attempts:  1    Attempted By:  CRNA    Method of Intubation:  Direct    Blade:  Rivera 2    Laryngeal View Grade: Grade I - full view of cords      Difficult Airway Encountered?: No      Complications:  None    Airway Device:  Oral endotracheal tube    Airway Device Size:  7.0    Style/Cuff Inflation:  Cuffed (inflated to minimal occlusive pressure)    Tube secured:  22    Secured at:  The lips    Placement Verified By:  Capnometry    Complicating Factors:  None    Findings Post-Intubation:  BS equal bilateral and atraumatic/condition of teeth unchanged

## 2025-07-25 NOTE — ANESTHESIA POSTPROCEDURE EVALUATION
Anesthesia Post Evaluation    Patient: Panfilo Tolentino    Procedure(s) Performed: Procedure(s) (LRB):  INSERTION, INTRAMEDULLARY IMPLANT, FEMUR (Left)    Final Anesthesia Type: general      Patient location during evaluation: PACU  Patient participation: Yes- Able to Participate  Level of consciousness: awake and alert  Post-procedure vital signs: reviewed and stable  Pain management: adequate  Airway patency: patent    PONV status at discharge: No PONV  Anesthetic complications: no      Cardiovascular status: hemodynamically stable  Respiratory status: spontaneous ventilation and room air  Hydration status: euvolemic  Follow-up not needed.              Vitals Value Taken Time   /59 07/25/25 14:51   Temp  07/25/25 14:56   Pulse 57 07/25/25 14:55   Resp 14 07/25/25 14:55   SpO2 93 % 07/25/25 14:55   Vitals shown include unfiled device data.      No case tracking events are documented in the log.      Pain/Ralph Score: Pain Rating Prior to Med Admin: 5 (7/25/2025  1:22 PM)  Pain Rating Post Med Admin: 4 (7/25/2025  5:24 AM)  Ralph Score: 10 (7/25/2025  1:13 PM)

## 2025-07-25 NOTE — PT/OT/SLP PROGRESS
"Physical Therapy      Patient Name:  Panfilo Tolentino   MRN:  [id number]    Initial consult received from ED DO.   Chart reviewed; per Ortho MD, "Patient has a left intertrochanteric femur fracture meeting surgical indications for stabilization.  Patient and family understand risks best procedure stated below.  This will allow immediate weight-bearing and excellent pain control.  We will get the patient to surgery within 24 hour window".   Therefore, PT to sign off of this original order and await new order from the ortho team post op as appropriate.     "

## 2025-07-25 NOTE — H&P
[facility]  Emergency [facility] MEDICINE - H&P ADMISSION NOTE      Patient Name: Panfilo Tolentino  MRN: [id number]  Patient Class: IP- Inpatient   Admission Date: 7/25/2025   Admitting Physician: LONNIE Service   Attending Physician: Thairy G Reyes, DO  Primary Care Provider: Karen, Primary Doctor  Face-to-Face encounter date: 07/25/2025        CHIEF COMPLAINT     Chief Complaint   Patient presents with    Fall     Pt arrives via [facility] for a slip and fall landed on left hip, left leg is externally rotated and has pain to left hip. PMS intact. No LOC no BT        HISTORY OF PRESENTING ILLNESS   69-year-old male with a past medical history of hypertension, tobacco dependence who was walking on wet floor when he slipped and fell.  Denies syncope, head injury, loss of sphincter control, tongue biting.  He was unable to get up after therefore was brought to the emergency room where he was found to have a left intertrochanteric femur fracture.    At baseline the patient lives with his wife and is independent.  PAST MEDICAL HISTORY     Past Medical History:   Diagnosis Date    Hypertension     Mixed hyperlipidemia        PAST SURGICAL HISTORY     Past Surgical History:   Procedure Laterality Date    VASCULAR SURGERY         FAMILY HISTORY   Reviewed and noncontributory to this case    SOCIAL HISTORY   Social History[1]    HOME MEDICATIONS     Prior to Admission medications    Medication Sig Start Date End Date Taking? Authorizing Provider   losartan (COZAAR) 50 MG tablet Take 50 mg by mouth. 4/27/25  Yes Provider, Historical   pravastatin (PRAVACHOL) 20 MG tablet 1 tablet Orally Once a day for 30 day(s)   Yes Provider, Historical   ascorbic acid, vitamin C, (VITAMIN C) 1000 MG tablet 1 tablet Orally Once a day for 30 day(s)    Provider, Historical   mv-min-folic-K1-lycopen-lutein 270--300 mcg Tab as directed Orally    Provider, Historical   omega 3-dha-epa-fish oil (FISH OIL) 1,000 (120-180) mg Cap 1  capsule.    Provider, Historical       ALLERGIES   Patient has no known allergies.    REVIEW OF SYSTEMS   Except as documented above, all other systems reviewed and negative    PHYSICAL EXAM     Vitals:    07/25/25 0758   BP:    Pulse:    Resp: (!) 23   Temp:       General:  In no acute distress, resting comfortably  Head and neck:  Atraumatic, normocephalic, moist mucous membranes, supple neck  Chest:  Clear to auscultation bilaterally  Heart:  S1, S2, no appreciable murmur  Abdomen:  Soft, nontender, BS +  MSK: L FREDY shortened and external rotated   Neuro:  Alert and oriented x4, moving all extremities with good strength  Integumentary:  No obvious skin rash  Psychiatry:  Appropriate mood and affect  ASSESSMENT AND PLAN   Intertrochanteric proximal femur fracture   -evaluated by Orthopedic surgery this morning, pending stabilization in the OR today   -pain control, bowel regimen as needed   -PT/OT when okay with Orthopedic surgery    Primary hypertension   -resume home losartan    Tobacco dependence   -1 pack per day, p.r.n. nicotine patch    DVT prophylaxis:  Lovenox  Screening for Social Drivers for health:  Patient screened for food insecurity, housing instability, transportation needs, utility difficulties, and interpersonal safety (select all that apply as identified as concern)  []Housing or Food  []Transportation Needs  []Utility Difficulties  []Interpersonal safety  [x]None  __________________________________________________________________  LABS/MICRO/MEDS/DIAGNOSTICS       LABS  Recent Labs     07/25/25  0502      K 4.4   CO2 26   BUN 16.0   CREATININE 0.89   CALCIUM 8.6*   ALKPHOS 73   AST 20   ALT 21   ALBUMIN 3.7     Recent Labs     07/25/25  0502   WBC 17.39*   RBC 4.53*   HCT 42.6   MCV 94.0          MICROBIOLOGY  Microbiology Results (last 7 days)       ** No results found for the last 168 hours. **            MEDICATIONS   losartan  50 mg Oral Daily    pravastatin  20 mg Oral Daily       INFUSIONS      DIAGNOSTIC TESTS  X-Ray Hip 2 or 3 views Left with Pelvis when performed    (Results Pending)   X-Ray Chest 1 View    (Results Pending)          Patient information was obtained from patient, patient's family, past medical records and ER records.   All diagnosis and differential diagnosis have been reviewed; assessment and plan has been documented. I have personally reviewed the labs and test results that are presently available; I have reviewed the patients medication list. I have reviewed the consulting providers response and recommendations. I have reviewed or attempted to review medical records based upon their availability.  All of the patient's questions have been addressed and answered. Patient's is agreeable to the above stated plan. I will continue to monitor closely and make adjustments to medical management as needed.  This note was created using [employer] voice recognition software that occasionally misinterpreted phrases or words.  Please contact me if any questions may rise regarding documentation to clarify verbiage.        Thairy G Reyes, DO   Internal Medicine  Department of Hospital Medicine  [facility] - Emergency Dept       [1]   Social History  Socioeconomic History    Marital status:    Tobacco Use    Smoking status: Every Day     Current packs/day: 1.00     Types: Cigarettes    Smokeless tobacco: Never   Substance and Sexual Activity    Alcohol use: Yes    Drug use: Not Currently

## 2025-07-25 NOTE — PROGRESS NOTES
Pt Hx and procedure discussed in detail. Post op orders reviewed. Pt attached to v/s machine and vitals assessed. Procedure site and IV sites assessed and intact. Pre-op symptoms compared to post op symptoms. No family at bedside, but updated on patient status and location. Everyone understands pt info with no further questions.

## 2025-07-25 NOTE — CONSULTS
OPERATIVE REPORT    Patient: Panfilo Tolentino   : 1955    MRN: [id number]  Date: 2025      Surgeon:Braden Herring DO  Assistant: Jose Eduardo Douglas was essential, part of the procedure including deep hardware placement and deep closure.  No senior assistant was availible  Preoperative Diagnosis: : Closed left intertrochanteric femur fracture  Postoperative Diagnosis: Same  Procedure:  Treatment left intertrochanteric  femur fracture with intramedullary nail CPT 65077  Anesthesiologist: Melvin Guzman MD  OR Staff: Circulator: Yahir Ariza RN; Iwona Garcia RN  Physician Assistant: Kaylan Douglas PA-C  Radiology Technologist: Fan Brooks RT  Scrub Person: Ruby De La Cruz ST  Implants:   Implant Name Type Inv. Item Serial No.  Lot No. LRB No. Used Action   BLADE HELICAL PERF GOLD 95MM - [id number]  BLADE HELICAL PERF GOLD 95MM  SYNTHES [id number] Left 1 Implanted   NAIL IM FRANCISCA 130 DEG 11X400 L - [id number]  NAIL IM FRANCISCA 130 DEG 11X400 L  [employer] [id number] Left 1 Implanted   DEBORA REAM BALL TP [id number] - [id number]  DEBORA REAM BALL TP [id number]  [employer] [id number] Left 1 Implanted   WIRE GUIDE 3.2MM 400MM - [id number]  WIRE GUIDE 3.2MM 400MM  SYNTHES  Left 2 Implanted and Explanted   5.0mm x L 50mm XL 25 Locking Screw For IM Nail    SYNTHES [id number] Left 1 Implanted   5.0mm X 46MM L, XL 25 Locking screw for IM Nail    SY[employer]ES [id number] Left 1 Implanted     EBL:  350 cc  Complications: None  Disposition: To PACU, stable    Indications: Panfilo Tolentino is a 69 y.o. male presenting with the aforementioned injuries. The procedure is indicated to best obtain and maintain stability of the femur while allowing early ROM.  The patient is awake and alert. After thorough discussion of the risks, benefits, expected outcomes, and alternatives to surgical intervention, the patient agreed to proceed with surgical treatment.  Specific risks discussed  included, but were not limited to: superficial or deep infection, wound healing complications, DVT/PE, significant bleeding requiring transfusion, damage to named anatomic structures in the immediate area including named neurovascular structures, implant failure, and general risks of anesthesia.  The patient voiced understanding and written as well as verbal consent was obtained by myself prior to the procedure.    Procedure in Detail:  The patient's left lower extremity was marked by me in the preoperative area.  He was taken to the operating room, and after satisfactory induction of anesthesia, the patient was placed in the supine position with a small bump under the ipsilateral hip.  perineal post placed all bony prominences well padded patient's boots were placed in the [place] table.  Patient received a seatbelt abdominal strap.  The ipsilateral lower extremity was then sterilely prepped and draped in the usual sterile fashion.  Standard time out procedure was performed confirming the correct surgeon, site, side, patient ID, procedure, and administration of antibiotics.     We are going to the procedure with the opening of the fracture on the [place] table.  Made a 4 cm incision laterally patient has a large greater trochanter fragment.  We then reduced this with a clamp began our nail    A 3 cm longitudinal incision was made just proximal to the greater trochanter.  The subcutaneous tissue and gluteal fascia were incised.  A threaded guide pin was then placed in the tip of the greater trochanter and extended and introduced into the proximal femur under AP and lateral fluoroscopy.  The Synthes one-step reamer was then used to ream proximally. A ball-tipped guide tootie was then placed through the entry site down to the physeal scar of the femur.  This was measured and then was reamed .  A Synthes TFN  appropiate size  was then passed over the guidewire, which was subsequently removed.  The proximal interlocking device  was then placed and a 2-cm longitudinal incision was made over the lateral aspect of the proximal thigh and the fascia tom was incised.  A threaded guide pin was then placed through the lateral cortex of the femur through the femoral neck and into the femoral head in the center-center position.  A helical blade was then placed under fluoroscopy and satisfactory position was noted on AP and lateral fluoroscopy and the setscrew was then set. The proximal interlocking device was then removed.    To stabilize the trochanter fracture we then placed a 6.5 partially-threaded cannulated screw from Synthes.    Distal interlocking screws were done with  two single lateral to medial interlocking screw under fluoroscopic guidance.  All wounds were then thoroughly irrigated.  Subcutaneous tissues were closed with interrupted inverted of 2-0 Monocryl.  Skin was approximated using skin staples.  Sterile dressing was applied.  General anesthesia was reversed and she was returned to the Postanesthesia Care Unit in stable condition.      All sponge and needle counts were correct at the end of the case.  I was present and participated in all aspects of the procedure.    Prognosis:  The patient will be kept WBAT on the ipsilateral extremity.  DVT prophylaxis is indicated for this patient and procedure.  The patient is at risk of pain, infection, and nonunion, and we will watch for all of these, amongst other issues, as the patient continues to heal.    Patient may need return to the OR for excisional debridement or washout if infection/skin necrosis is observed.      This note/OR report was created with the assistance of  voice recognition software or phone  dictation.  There may be transcription errors as a result of using this technology however minimal. Effort has been made to assure accuracy of transcription but any obvious errors or omissions should be clarified with the author of the document.       Braden Herring, DO  Orthopedic  Trauma Surgery

## 2025-07-25 NOTE — PT/OT/SLP EVAL
"Initial consult received from ED DO.   Chart reviewed; per Ortho MD, "Patient has a left intertrochanteric femur fracture meeting surgical indications for stabilization.  Patient and family understand risks best procedure stated below.  This will allow immediate weight-bearing and excellent pain control.  We will get the patient to surgery within 24 hour window".   Therefore, OT to sign off of this original order and await new order from the ortho team post op as appropriate.  "

## 2025-07-25 NOTE — CONSULTS
[facility] - Emergency Dept  Orthopedic Trauma  Consult Note    Patient Name: Panfilo Tolentino  MRN: [id number]  Admission Date: 7/25/2025  Hospital Length of Stay: 0 days  Attending Provider: Blanco Valladares MD  Primary Care Provider: No primary care provider on file.        Inpatient consult to Orthopedic Surgery  Consult performed by: Braden Herring DO  Consult ordered by: Vani Garcia MD        Subjective:         Chief Complaint:   Chief Complaint   Patient presents with    Fall     Pt arrives via [facility] for a slip and fall landed on left hip, left leg is externally rotated and has pain to left hip. PMS intact. No LOC no BT         HPI:  Patient has Left hip pain status post ground level fall.  he is diagnosed with a intertrochanteric proximal femur fracture dull achy pain to the hip without radiation no previous injury.  Patient has no numbness or tingling.  Pain medication makes it better rest makes it better movement makes it worse.    Past Medical History:   Diagnosis Date    Hypertension     Mixed hyperlipidemia        Past Surgical History:   Procedure Laterality Date    VASCULAR SURGERY         Review of patient's allergies indicates:  No Known Allergies    Current Facility-Administered Medications   Medication    acetaminophen tablet 1,000 mg    dextrose 50% injection 12.5 g    dextrose 50% injection 25 g    glucagon (human recombinant) injection 1 mg    glucose chewable tablet 16 g    glucose chewable tablet 24 g    naloxone 0.4 mg/mL injection 0.02 mg    ondansetron injection 4 mg    sodium chloride 0.9% flush 10 mL     Current Outpatient Medications   Medication Sig    losartan (COZAAR) 50 MG tablet Take 50 mg by mouth.    pravastatin (PRAVACHOL) 20 MG tablet 1 tablet Orally Once a day for 30 day(s)    ascorbic acid, vitamin C, (VITAMIN C) 1000 MG tablet 1 tablet Orally Once a day for 30 day(s)    mv-min-folic-K1-lycopen-lutein 626--300 mcg Tab as directed Orally    omega  "3-dha-epa-fish oil (FISH OIL) 1,000 (120-180) mg Cap 1 capsule.     Family History    None       Tobacco Use    Smoking status: Every Day     Current packs/day: 1.00     Types: Cigarettes    Smokeless tobacco: Never   Substance and Sexual Activity    Alcohol use: Yes    Drug use: Not Currently    Sexual activity: Not on file       ROS:  Constitutional: Denies fever chills  Eyes: No change in vision  ENT: No ringing or current infections  CV: No chest pain  Resp: No labored breathing  MSK: Pain evident at site of injury located in HPI,   Integ: No signs of abrasions or lacerations  Neuro: No numbness or tingling  Lymphatic: No swelling outside the area of injury   Objective:     Vital Signs (Most Recent):  Temp: 98 °F (36.7 °C) (07/25/25 0307)  Pulse: 61 (07/25/25 0632)  Resp: 16 (07/25/25 0632)  BP: 118/64 (07/25/25 0632)  SpO2: 95 % (07/25/25 0631) Vital Signs (24h Range):  Temp:  [98 °F (36.7 °C)] 98 °F (36.7 °C)  Pulse:  [56-64] 61  Resp:  [15-23] 16  SpO2:  [95 %-98 %] 95 %  BP: (110-136)/(54-75) 118/64     Weight: 98.9 kg (218 lb)  Height: 6' 2" (188 cm)  Body mass index is 27.99 kg/m².      Intake/Output Summary (Last 24 hours) at 7/25/2025 0706  Last data filed at 7/25/2025 0428  Gross per 24 hour   Intake --   Output 500 ml   Net -500 ml       Ortho/SPM Exam  General the patient is alert and oriented x3 no acute distress nontoxic-appearing appropriate affect.    Constitutional: Vital signs are examined and stable.  Resp: No signs of labored breathing               LLE: -Skin:  Painful logroll painful axial compression test           -MSK: EHL/FHL, Gastroc/Tib anterior Strength 5/5           -Neuro:  Sensation intact to light touch L3-S1 dermatomes           -Lymphatic: No signs of lymphadenopathy           -CV: Capillary refill is less than 2 seconds. DP/PT pulses 2/4. Compartments soft and compressible                        Significant Labs:   Recent Lab Results         07/25/25  5556        " Albumin/Globulin Ratio 1.2       Albumin 3.7       ALP 73       ALT 21       Anion Gap 9.0       PTT 24.8  Comment: For Minimal Heparin Infusion, the goal aPTT 64-85 seconds corresponds to an anti-Xa of 0.3-0.5.    For Low Intensity and High Intensity Heparin, the goal aPTT  seconds corresponds to an anti-Xa of 0.3-0.7       AST 20       Baso # 0.07       Basophil % 0.4       BILIRUBIN TOTAL 0.6       BUN 16.0       BUN/CREAT RATIO 18       Calcium 8.6       Chloride 102       CO2 26       Creatinine 0.89       eGFR >60  Comment: Estimated GFR calculated using the CKD-EPI creatinine (2021) equation.       Eos # 0.25       Eos % 1.4       Globulin, Total 3.2       Glucose 122       Hematocrit 42.6       Hemoglobin 13.9       Immature Grans (Abs) 0.11       Immature Granulocytes 0.6       INR 1.0       Lymph # 1.63       LYMPH % 9.4       MCH 30.7       MCHC 32.6       MCV 94.0       Mono # 0.59       Mono % 3.4       MPV 10.3       Neut # 14.74       Neut % 84.8       nRBC 0.0       Platelet Count 206       Potassium 4.4       PROTEIN TOTAL 6.9       PT 13.4       RBC 4.53       RDW 12.9       Sodium 137       WBC 17.39             All pertinent labs within the past 24 hours have been reviewed.  Recent Lab Results         07/25/25  0502        Albumin/Globulin Ratio 1.2       Albumin 3.7       ALP 73       ALT 21       Anion Gap 9.0       PTT 24.8  Comment: For Minimal Heparin Infusion, the goal aPTT 64-85 seconds corresponds to an anti-Xa of 0.3-0.5.    For Low Intensity and High Intensity Heparin, the goal aPTT  seconds corresponds to an anti-Xa of 0.3-0.7       AST 20       Baso # 0.07       Basophil % 0.4       BILIRUBIN TOTAL 0.6       BUN 16.0       BUN/CREAT RATIO 18       Calcium 8.6       Chloride 102       CO2 26       Creatinine 0.89       eGFR >60  Comment: Estimated GFR calculated using the CKD-EPI creatinine (2021) equation.       Eos # 0.25       Eos % 1.4       Globulin, Total 3.2        Glucose 122       Hematocrit 42.6       Hemoglobin 13.9       Immature Grans (Abs) 0.11       Immature Granulocytes 0.6       INR 1.0       Lymph # 1.63       LYMPH % 9.4       MCH 30.7       MCHC 32.6       MCV 94.0       Mono # 0.59       Mono % 3.4       MPV 10.3       Neut # 14.74       Neut % 84.8       nRBC 0.0       Platelet Count 206       Potassium 4.4       PROTEIN TOTAL 6.9       PT 13.4       RBC 4.53       RDW 12.9       Sodium 137       WBC 17.39                Significant Imaging: I have reviewed all pertinent imaging results/findings.  No results found.     Assessment/Plan:     There are no hospital problems to display for this patient.        Independent Radiology ordered by other provider:   Two views left hip skeletally mature individual has a left intertrochanteric femur fracture completely displaced.    Pt has acute injury with risk of severe bodily function with their injury.            Patient has a left intertrochanteric femur fracture meeting surgical indications for stabilization.  Patient and family understand risks best procedure stated below.  This will allow immediate weight-bearing and excellent pain control.  We will get the patient to surgery within 24 hour window.    Patient understands tobacco use leads to complications orthopedic surgery and need for multiple subsequent surgeries.       I explained that surgery and the nature of their condition are not without risks. These include, but are not limited to, bleeding, infection, neurovascular compromise, malunion, nonunion, hardware complications, wound complications, scarring, cosmetic defects, need for later and/or repeated surgeries, pain, loss of ROM, loss of function, PTOA, deformity, stance/gait and/or functional abnormalities, thromboembolic complications, compartment syndrome, loss of limb, loss of life, anesthetic complications, and other imponderables. I explained that these can occur despite the adequacy of treatments  rendered, and that their risks are heightened given the nature of their condition. They verbalized understanding. They would like to continue with surgery at this time. If appropriate family was involved with surgical discussion.           This note/OR report was created with the assistance of  voice recognition software or phone  dictation.  There may be transcription errors as a result of using this technology however minimal. Effort has been made to assure accuracy of transcription but any obvious errors or omissions should be clarified with the author of the document.       Braden Herring, DO   Orthopedic Trauma Surgery  [facility] - Emergency Dept

## 2025-07-26 PROBLEM — F17.200 TOBACCO DEPENDENCY: Status: ACTIVE | Noted: 2025-07-26

## 2025-07-26 LAB
ALBUMIN SERPL-MCNC: 3.3 G/DL (ref 3.4–4.8)
ALBUMIN/GLOB SERPL: 1.1 RATIO (ref 1.1–2)
ALP SERPL-CCNC: 65 UNIT/L (ref 40–150)
ALT SERPL-CCNC: 15 UNIT/L (ref 0–55)
ANION GAP SERPL CALC-SCNC: 8 MEQ/L
AST SERPL-CCNC: 22 UNIT/L (ref 11–45)
BASOPHILS # BLD AUTO: 0.04 X10(3)/MCL
BASOPHILS NFR BLD AUTO: 0.4 %
BILIRUB SERPL-MCNC: 0.7 MG/DL
BUN SERPL-MCNC: 17.8 MG/DL (ref 8.4–25.7)
CALCIUM SERPL-MCNC: 8.4 MG/DL (ref 8.8–10)
CHLORIDE SERPL-SCNC: 101 MMOL/L (ref 98–107)
CO2 SERPL-SCNC: 28 MMOL/L (ref 23–31)
CREAT SERPL-MCNC: 0.84 MG/DL (ref 0.72–1.25)
CREAT/UREA NIT SERPL: 21
EOSINOPHIL # BLD AUTO: 0.05 X10(3)/MCL (ref 0–0.9)
EOSINOPHIL NFR BLD AUTO: 0.4 %
ERYTHROCYTE [DISTWIDTH] IN BLOOD BY AUTOMATED COUNT: 12.8 % (ref 11.5–17)
GFR SERPLBLD CREATININE-BSD FMLA CKD-EPI: >60 ML/MIN/1.73/M2
GLOBULIN SER-MCNC: 2.9 GM/DL (ref 2.4–3.5)
GLUCOSE SERPL-MCNC: 126 MG/DL (ref 82–115)
HCT VFR BLD AUTO: 30.3 % (ref 42–52)
HGB BLD-MCNC: 10.4 G/DL (ref 14–18)
IMM GRANULOCYTES # BLD AUTO: 0.05 X10(3)/MCL (ref 0–0.04)
IMM GRANULOCYTES NFR BLD AUTO: 0.4 %
LYMPHOCYTES # BLD AUTO: 1.54 X10(3)/MCL (ref 0.6–4.6)
LYMPHOCYTES NFR BLD AUTO: 13.7 %
MAGNESIUM SERPL-MCNC: 1.8 MG/DL (ref 1.6–2.6)
MCH RBC QN AUTO: 31.7 PG (ref 27–31)
MCHC RBC AUTO-ENTMCNC: 34.3 G/DL (ref 33–36)
MCV RBC AUTO: 92.4 FL (ref 80–94)
MONOCYTES # BLD AUTO: 0.91 X10(3)/MCL (ref 0.1–1.3)
MONOCYTES NFR BLD AUTO: 8.1 %
NEUTROPHILS # BLD AUTO: 8.68 X10(3)/MCL (ref 2.1–9.2)
NEUTROPHILS NFR BLD AUTO: 77 %
NRBC BLD AUTO-RTO: 0 %
PLATELET # BLD AUTO: 155 X10(3)/MCL (ref 130–400)
PMV BLD AUTO: 10.4 FL (ref 7.4–10.4)
POTASSIUM SERPL-SCNC: 4.7 MMOL/L (ref 3.5–5.1)
PROT SERPL-MCNC: 6.2 GM/DL (ref 5.8–7.6)
RBC # BLD AUTO: 3.28 X10(6)/MCL (ref 4.7–6.1)
SODIUM SERPL-SCNC: 137 MMOL/L (ref 136–145)
WBC # BLD AUTO: 11.27 X10(3)/MCL (ref 4.5–11.5)

## 2025-07-26 PROCEDURE — 85025 COMPLETE CBC W/AUTO DIFF WBC: CPT | Performed by: STUDENT IN AN ORGANIZED HEALTH CARE EDUCATION/TRAINING PROGRAM

## 2025-07-26 PROCEDURE — 63600175 PHARM REV CODE 636 W HCPCS: Performed by: PHYSICIAN ASSISTANT

## 2025-07-26 PROCEDURE — 83735 ASSAY OF MAGNESIUM: CPT | Performed by: STUDENT IN AN ORGANIZED HEALTH CARE EDUCATION/TRAINING PROGRAM

## 2025-07-26 PROCEDURE — 97162 PT EVAL MOD COMPLEX 30 MIN: CPT

## 2025-07-26 PROCEDURE — 36415 COLL VENOUS BLD VENIPUNCTURE: CPT | Performed by: STUDENT IN AN ORGANIZED HEALTH CARE EDUCATION/TRAINING PROGRAM

## 2025-07-26 PROCEDURE — 25000003 PHARM REV CODE 250: Performed by: PHYSICIAN ASSISTANT

## 2025-07-26 PROCEDURE — 97166 OT EVAL MOD COMPLEX 45 MIN: CPT

## 2025-07-26 PROCEDURE — 82728 ASSAY OF FERRITIN: CPT | Performed by: STUDENT IN AN ORGANIZED HEALTH CARE EDUCATION/TRAINING PROGRAM

## 2025-07-26 PROCEDURE — 25000003 PHARM REV CODE 250: Performed by: STUDENT IN AN ORGANIZED HEALTH CARE EDUCATION/TRAINING PROGRAM

## 2025-07-26 PROCEDURE — 83550 IRON BINDING TEST: CPT | Performed by: STUDENT IN AN ORGANIZED HEALTH CARE EDUCATION/TRAINING PROGRAM

## 2025-07-26 PROCEDURE — 99900035 HC TECH TIME PER 15 MIN (STAT)

## 2025-07-26 PROCEDURE — 80053 COMPREHEN METABOLIC PANEL: CPT | Performed by: STUDENT IN AN ORGANIZED HEALTH CARE EDUCATION/TRAINING PROGRAM

## 2025-07-26 PROCEDURE — 11000001 HC ACUTE MED/SURG PRIVATE ROOM

## 2025-07-26 PROCEDURE — 82607 VITAMIN B-12: CPT | Performed by: STUDENT IN AN ORGANIZED HEALTH CARE EDUCATION/TRAINING PROGRAM

## 2025-07-26 PROCEDURE — 63600175 PHARM REV CODE 636 W HCPCS: Performed by: STUDENT IN AN ORGANIZED HEALTH CARE EDUCATION/TRAINING PROGRAM

## 2025-07-26 RX ORDER — OXYCODONE HYDROCHLORIDE 5 MG/1
5 TABLET ORAL EVERY 4 HOURS PRN
Refills: 0 | Status: DISCONTINUED | OUTPATIENT
Start: 2025-07-26 | End: 2025-07-31 | Stop reason: HOSPADM

## 2025-07-26 RX ADMIN — POLYETHYLENE GLYCOL 3350 17 G: 17 POWDER, FOR SOLUTION ORAL at 02:07

## 2025-07-26 RX ADMIN — OXYCODONE HYDROCHLORIDE 10 MG: 10 TABLET ORAL at 09:07

## 2025-07-26 RX ADMIN — CEFAZOLIN 2 G: 2 INJECTION, POWDER, FOR SOLUTION INTRAMUSCULAR; INTRAVENOUS at 11:07

## 2025-07-26 RX ADMIN — PRAVASTATIN SODIUM 20 MG: 10 TABLET ORAL at 09:07

## 2025-07-26 RX ADMIN — CEFAZOLIN 2 G: 2 INJECTION, POWDER, FOR SOLUTION INTRAMUSCULAR; INTRAVENOUS at 03:07

## 2025-07-26 RX ADMIN — OXYCODONE HYDROCHLORIDE 10 MG: 10 TABLET ORAL at 02:07

## 2025-07-26 RX ADMIN — OXYCODONE HYDROCHLORIDE 10 MG: 10 TABLET ORAL at 05:07

## 2025-07-26 RX ADMIN — TIZANIDINE 4 MG: 4 TABLET ORAL at 02:07

## 2025-07-26 RX ADMIN — LOSARTAN POTASSIUM 50 MG: 50 TABLET, FILM COATED ORAL at 09:07

## 2025-07-26 RX ADMIN — ENOXAPARIN SODIUM 40 MG: 40 INJECTION SUBCUTANEOUS at 05:07

## 2025-07-26 RX ADMIN — MORPHINE SULFATE 4 MG: 4 INJECTION, SOLUTION INTRAMUSCULAR; INTRAVENOUS at 01:07

## 2025-07-26 NOTE — PT/OT/SLP EVAL
Occupational Therapy  Evaluation    Name: Panfilo Tolentino  MRN: [id number]  Recent Surgery: Procedure(s) (LRB):  INSERTION, INTRAMEDULLARY IMPLANT, FEMUR (Left) 1 Day Post-Op    Recommendations:     Discharge therapy intensity: Low Intensity Therapy (pending progress)   Discharge Equipment Recommendations:  walker, rolling, shower chair  Barriers to discharge:  None    Assessment:     Panfilo Tolentino is a 69 y.o. male with a medical diagnosis of L intertrochanteric femur fracture s/p IM nail (7/25/25) related to slip and fall at home.  He presents with the following performance deficits affecting function: weakness, impaired endurance, impaired self care skills, impaired functional mobility, gait instability, impaired balance, decreased safety awareness, pain, impaired cardiopulmonary response to activity, orthopedic precautions. Patient with good tolerance for today's session. Patient presents with primary limitation of dizziness with changes in positioning, orthostatic hypotension with engagement in functional transfers with prolonged standing, L LE pain, L LE weakness, L LE edema, unfamiliarity with adaptive techniques for BADLs, and requires min A for functional transfers w/ RW impeding independence in meaningful activities of daily living. Patient to benefit from continued skilled OT intervention to address aforementioned deficits to reduce risk of falls and improve occupational performance prior to next level of care.      DME Justification:  Panfilo's mobility limitation cannot be sufficiently resolved by the use of a cane. His functional mobility deficit can be sufficiently resolved with the use of a Rolling Walker. Patient's mobility limitation significantly impairs their ability to participate in one of more activities of daily living.  The use of a RW will significantly improve the patient's ability to participate in MRADLS and the patient will use it on regular basis in the home.    Rehab  Prognosis: Good; patient would benefit from acute skilled OT services to address these deficits and reach maximum level of function.       Plan:     Patient to be seen 6 x/week to address the above listed problems via self-care/home management, therapeutic activities, therapeutic exercises  Plan of Care Expires: 25  Plan of Care Reviewed with: patient, spouse, family    Subjective     Chief Complaint: L LE pain and dizziness/weakness with prolonged standing   Patient/Family Comments/goals: Return home to [facility]    Occupational Profile:  Living Environment: Lives with wife in single level home with 3 steps to enter   Previous level of function: Independent, very active   Roles and Routines: Father, Spouse   Equipment Used at Home: none  Assistance upon Discharge: Spouse to assist as needed     Pain/Comfort:  Pain Rating 1: other (see comments) (Pt complained of pain with mobility but did not objectively rate.)  Location - Side 1: Left  Location 1: hip  Pain Addressed 1: Pre-medicate for activity, Reposition, Distraction, Cessation of Activity    Patients cultural, spiritual, Yazidism conflicts given the current situation: no    Objective:     OT communicated with RN prior to session.      Patient was found supine with bed alarm, blood pressure cuff, telemetry upon OT entry to room.    General Precautions: Standard, fall  Orthopedic Precautions: LLE weight bearing as tolerated (full ROM)  Braces: N/A    Vital Signs: Blood Pressure:   Sittin/66  Standing (initial): 93/51- mild symptoms  Standing (8 min): 55/41- symptomatic  Supine: 118/59  Transfer to chair: 102/55  Sitting in chair Les elevated: 125/63 asymptomatic    Functional Mobility/Transfers:  Bed mobility:    Supine to Sit: contact guard assistance 1st trial with verbal cues to sequencing secondary to unfamiliarity, minimum assistance 2nd trial following orthostatic hypotension in prolonged standing  Sit to Supine:  total assistance, for patient  safety with return to supine with symptomatic hypotension  Transfers: Sit to Stand: minimum assistance with rolling walker  Bed to Chair: minimum assistance with rolling walker using Step Transfer    Activities of Daily Living:  Upper Body Dressing: contact guard assistance Patient don hospital gown as robe in unsupported static sitting EOB with touching assist for adjusting around posterior trunk   Lower Body Dressing: moderate assistance patient demo ability to don/doff R sock in modified figure 4 with mod I for increased time; req total assist to don L sock secondary to pain and weakness Patient to benefit from education on long handled adaptive equipment     [facility] 6 Click ADL:  [facility] Total Score: 14    Functional Cognition:  Intact    Visual Perceptual Skills:  Intact    Upper Extremity Function:  Right Upper Extremity:   WFL    Left Upper Extremity:  WFL    Balance:   Static sitting EOB req SBA for safety. Static standing w/ RW initially SBA progressed to Min A with symptomatic hypotension    Therapeutic Positioning  Risk for acquired pressure injuries is increased due to relative decrease in mobility d/t hospitalization .    OT interventions performed during the course of today's session:   Education was provided on benefits of and recommendations for therapeutic positioning    Co-Treatment: Yes, due to Limited activity tolerance    Patient Education:  Patient and spouse were provided with verbal education education regarding OT role/goals/POC, post op precautions, fall prevention, safety awareness, Discharge/DME recommendations, and home exercise program .  Understanding was verbalized.     Patient left up in chair with all lines intact, call button in reach, and RN notified.    GOALS:   Multidisciplinary Problems       Occupational Therapy Goals          Problem: Occupational Therapy    Goal Priority Disciplines Outcome Interventions   Occupational Therapy Goal     OT, PT/OT Progressing    Description: LTG:  Pt will perform basic ADLs and ADL transfers with Modified independence using LRAD by discharge.    STG: to be met by 8/9    Pt will complete grooming standing at sink with LRAD with SBA.  Pt will complete UB dressing with SBA.  Pt will complete LB dressing with SBA using LRAD.  Pt will complete toileting with SBA using LRAD.  Pt will complete functional mobility to/from toilet and toilet transfer with SBA using LRAD.                         History:     Past Medical History:   Diagnosis Date    Hypertension     Mixed hyperlipidemia          Past Surgical History:   Procedure Laterality Date    VASCULAR SURGERY         Time Tracking:     OT Date of Treatment: 07/26/25  OT Start Time: 1001  OT Stop Time: 1031  OT Total Time (min): 30 min    Billable Minutes:Evaluation 30    7/26/2025    no

## 2025-07-26 NOTE — PROGRESS NOTES
[facility] - 8th Floor Med Surg  Orthopedics  Progress Note    Patient Name: Panfilo Tolentino  MRN: [id number]  Admission Date: 7/25/2025  Hospital Length of Stay: 1 days  Attending Provider: Blanco Valladares MD  Primary Care Provider: Karen, Primary Doctor  Follow-up For: Procedure(s) (LRB):  INSERTION, INTRAMEDULLARY IMPLANT, FEMUR (Left)    Post-Operative Day: 1 Day Post-Op  Subjective:     Principal Problem:Intertrochanteric fracture of left femur, closed, initial encounter    Principal Orthopedic Problem: L hip IT fx; IMN placement 7/25/25    Interval History:POD 1- Resting in bed comfortably. NAD; EULALIA  AFVSS.  Patient attempted working with PT this evening but experienced orthostatic hypotension limiting his session.  He denies any shortness of breath, fatigue or other signs of anemia when resting in bed this morning.    Review of patient's allergies indicates:  No Known Allergies    Current Facility-Administered Medications   Medication    acetaminophen tablet 650 mg    albuterol-ipratropium 2.5 mg-0.5 mg/3 mL nebulizer solution 3 mL    aluminum-magnesium hydroxide-simethicone 200-200-20 mg/5 mL suspension 30 mL    bisacodyL suppository 10 mg    ceFAZolin 2 g    dextrose 50% injection 12.5 g    dextrose 50% injection 25 g    enoxaparin injection 40 mg    glucagon (human recombinant) injection 1 mg    glucose chewable tablet 16 g    glucose chewable tablet 24 g    losartan tablet 50 mg    melatonin tablet 9 mg    morphine injection 4 mg    naloxone 0.4 mg/mL injection 0.02 mg    nicotine 21 mg/24 hr 1 patch    ondansetron disintegrating tablet 8 mg    ondansetron injection 4 mg    oxyCODONE immediate release tablet 5 mg    oxyCODONE immediate release tablet Tab 10 mg    polyethylene glycol packet 17 g    pravastatin tablet 20 mg    simethicone chewable tablet 80 mg    sodium chloride 0.9% flush 10 mL    tiZANidine tablet 4 mg     Objective:     Vital Signs (Most Recent):  Temp: 98.5 °F (36.9  "°C) (07/26/25 0401)  Pulse: 66 (07/26/25 0401)  Resp: 20 (07/26/25 0259)  BP: 135/66 (07/26/25 0401)  SpO2: 95 % (07/26/25 0401) Vital Signs (24h Range):  Temp:  [98.2 °F (36.8 °C)-98.8 °F (37.1 °C)] 98.5 °F (36.9 °C)  Pulse:  [55-85] 66  Resp:  [10-23] 20  SpO2:  [89 %-100 %] 95 %  BP: ()/(56-82) 135/66     Weight: 98.9 kg (218 lb)  Height: 6' 2" (188 cm)  Body mass index is 27.99 kg/m².      Intake/Output Summary (Last 24 hours) at 7/26/2025 0632  Last data filed at 7/26/2025 0306  Gross per 24 hour   Intake 700 ml   Output 1450 ml   Net -750 ml       Physical exam:   MSK:  Left lower extremity compartments are soft and warm.  There are no signs or symptoms of a DVT or infection.  Dressings are clean dry and intact.  Tolerates gentle motion of the knee and hip.  Neurovascularly intact distally.    Significant Labs:   Recent Lab Results       None          All pertinent labs within the past 24 hours have been reviewed.    Significant Imaging: I have reviewed all pertinent imaging results/findings.    Assessment/Plan:     Active Diagnoses:    Diagnosis Date Noted POA    PRINCIPAL PROBLEM:  Intertrochanteric fracture of left femur, closed, initial encounter [S72.142A] 07/25/2025 Yes    Anemia [D64.9] 07/25/2025 Yes      Problems Resolved During this Admission:     69-year-old man with left hip IT fracture  -weight-bearing as tolerated.  Range of motion as tolerated to lower extremity  -ambulate with PT when safe to do so  -complete postop antibiotics  -DVT prophylaxis: Lovenox  -change dressing on 07/27/2025  -awaiting collection of labs today-reached out to nurse to ensure these are collected today to ensure proper monitoring for low hemoglobin and hematocrit in setting of hypotension; we will defer treatment if necessary to primary team    Park Chowdhury PA-C  Orthopedics  [facility] - 8th Floor Med Surg  "

## 2025-07-26 NOTE — PROGRESS NOTES
[facility] - 8th Floor [facility] MEDICINE ~ PROGRESS NOTE    CHIEF COMPLAINT   Hospital follow up    HOSPITAL COURSE   69-year-old male with a past medical history of hypertension, tobacco dependence who was walking on wet floor when he slipped and fell.  Denies syncope, head injury, loss of sphincter control, tongue biting.  He was unable to get up after therefore was brought to the emergency room where he was found to have a left intertrochanteric femur fracture.     At baseline the patient lives with his wife and is independent.    Today  Patient's nurse reported he was orthostatic positive also symptomatic:125/66 sitting, 93/51 initial stand, unable to recover and dropped to 55/41 with approximately 8 min of standing. CBC ordered for this morning remains pending, this has been discussed with nursing staff, lab needs to draw I have reordered labs once again stat  OBJECTIVE/PHYSICAL EXAM     VITAL SIGNS (MOST RECENT):  Temp: 97.6 °F (36.4 °C) (07/26/25 1218)  Pulse: 75 (07/26/25 1327)  Resp: 18 (07/26/25 1335)  BP: 125/61 (07/26/25 1327)  SpO2: 97 % (07/26/25 1327) VITAL SIGNS (24 HOUR RANGE):  Temp:  [97.6 °F (36.4 °C)-98.8 °F (37.1 °C)] 97.6 °F (36.4 °C)  Pulse:  [55-75] 75  Resp:  [10-20] 18  SpO2:  [93 %-97 %] 97 %  BP: (108-140)/(56-82) 125/61   GENERAL: In no acute distress, afebrile  HEENT:  PERRLA  CHEST: Clear to auscultation bilaterally  HEART: S1, S2, no appreciable murmur  ABDOMEN: Soft, nontender, BS +  MSK:  Bandaging along left hip  NEUROLOGIC: Alert and oriented x4, moving all extremities with good strength     ASSESSMENT/PLAN   Intertrochanteric proximal femur fracture   -status post left intertrochanteric femur fracture with IM nail on 07/25/2025 with Dr. Braden Herring  -weight-bearing as tolerated per Orthopedic surgery  -pain control, bowel regimen as needed   -PT/OT when okay with Orthopedic surgery     Primary hypertension   + orthostatics  -resume home losartan  -suspect  patient needs transfusion however lab has yet to draw CBC     Tobacco dependence   -1 pack per day, p.r.n. nicotine patch     DVT prophylaxis:  Lovenox  Anticipated discharge and disposition:  TBD  __________________________________________________________________________    NUTRITIONAL STATUS     Patient meets ASPEN criteria for   malnutrition of   per RD assessment as evidenced by:                       A minimum of two characteristics is recommended for diagnosis of either severe or non-severe malnutrition.     LABS/MICRO/MEDS/DIAGNOSTICS       LABS  Recent Labs     07/25/25  0502      K 4.4   CO2 26   BUN 16.0   CREATININE 0.89   CALCIUM 8.6*   ALKPHOS 73   AST 20   ALT 21   ALBUMIN 3.7     Recent Labs     07/25/25  0502   WBC 17.39*   RBC 4.53*   HCT 42.6   MCV 94.0          MICROBIOLOGY  Microbiology Results (last 7 days)       ** No results found for the last 168 hours. **               MEDICATIONS   enoxparin  40 mg Subcutaneous Daily    losartan  50 mg Oral Daily    pravastatin  20 mg Oral Daily         INFUSIONS         DIAGNOSTIC TESTS  X-Ray Femur 2 View Left   Final Result      As above.         Electronically signed by: Tc Enriquez   Date:    07/25/2025   Time:    13:56      SURG FL Surgery Fluoro Usage   Final Result      X-Ray Chest 1 View   Final Result      No acute cardiopulmonary process identified.         Electronically signed by: Irving Lilly   Date:    07/25/2025   Time:    09:14      X-Ray Hip 2 or 3 views Left with Pelvis when performed   Final Result           No echocardiogram results found for the past 14 days.         Case related differential diagnoses have been reviewed; assessment and plan has been documented. I have personally reviewed the labs and test results that are currently available; I have reviewed the patients medication list. I have reviewed the consulting providers recommendations. I have reviewed or attempted to review medical records based upon their  availability.  All of the patient's and/or family's questions have been addressed and answered to the best of my ability.  I will continue to monitor closely and make adjustments to medical management as needed.  This document was created using M*Modal Fluency Direct.  Transcription errors may have been made.  Please contact me if any questions may rise regarding documentation to clarify transcription.        Thairy G Reyes, DO   Internal Medicine  Department of Hospital Medicine  [facility] - 8th Floor Med Surg

## 2025-07-26 NOTE — PT/OT/SLP EVAL
Physical Therapy Evaluation    Patient Name:  Panfilo Tolentino   MRN:  [id number]    Recommendations:     Discharge therapy intensity: Low Intensity Therapy (pending improvement in orthostatic hypotension)   Discharge Equipment Recommendations: walker, rolling (borrowing from a friend)   Barriers to discharge: Ongoing medical needs- significant orthostatic hypotension    Assessment:     Panfilo Tolentino is a 69 y.o. male admitted with a medical diagnosis of L intertrochanteric femur fracture s/p IM nail (7/25/25) related to slip and fall at home.  He presents with the following impairments/functional limitations: weakness, impaired endurance, impaired functional mobility, gait instability, impaired balance, decreased lower extremity function, pain, edema, impaired cardiopulmonary response to activity. Pt with good effort to mobilize but was limited by symptomatic hypotension with prolonged standing. See detailed vitals below. MD and nurse made aware. Regarding mobility, pt required overall min A due to pain and stiffness in L hip. Fair tolerance to weight bearing on L LE in standing position but only able to safely assess stand step transfer to bedside chair. PT anticipates pt to progress well once medically optimized and be safe for low intensity upon discharge, however would benefit from ongoing PT sessions to further improve mobility and ensure safety with ambulation and stairs.    DME Justification:   Panfilo's mobility limitation cannot be sufficiently resolved by the use of a cane. His functional mobility deficit can be sufficiently resolved with the use of a Rolling Walker. Patient's mobility limitation significantly impairs their ability to participate in one of more activities of daily living.  The use of a RW will significantly improve the patient's ability to participate in MRADLS and the patient will use it on regular basis in the home.    Rehab Prognosis: Good; patient would benefit from  acute skilled PT services to address these deficits and reach maximum level of function.    Recent Surgery: Procedure(s) (LRB):  INSERTION, INTRAMEDULLARY IMPLANT, FEMUR (Left) 1 Day Post-Op    Plan:     During this hospitalization, patient would benefit from acute PT services 6 x/week to address the identified rehab impairments via gait training, therapeutic activities, therapeutic exercises and progress toward the following goals:    Plan of Care Expires:  25    Subjective     Chief Complaint: L hip pain and dizziness with standing  Patient/Family Comments/goals: return home with minimal assistance required  Pain/Comfort:  Pain Rating 1:  (Pt complained of pain with mobility but did not objectively rate.)  Location - Side 1: Left  Location 1: hip  Pain Addressed 1: Reposition, Pre-medicate for activity, Distraction, Cessation of Activity    Patients cultural, spiritual, Mormon conflicts given the current situation: no    Living Environment:  Pt lives with spouse in single level home with 3 steps to enter, no railing present. 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   Equipment used at home: none.  DME owned (not currently used): none and able to borrow needed equipment from friend.  Upon discharge, patient will have assistance from spouse and family friend.    Objective:     Communicated with nurse prior to session.  Patient found HOB elevated with peripheral IV  upon PT entry to room.    General Precautions: Standard, fall  Orthopedic Precautions:LLE weight bearing as tolerated (full ROM)   Braces: N/A  Respiratory Status: Room air  Blood Pressure:   Sittin/66  Standing (initial): 93/51- mild symptoms  Standing (8 min): 55/41- symptomatic  Supine: 118/59  Transfer to chair: 102/55  Sitting in chair Les elevated: 125/63 asymptomatic    Exams:  Cognitive Exam:  Patient is oriented to Person, Place, Time, and Situation  Gross Motor Coordination:   WFL  RLE ROM: WFL  RLE Strength: WFL  LLE ROM: hip and knee mobility limited by pain, stiffness, and edema. Able to reach 90/90 positioning sitting EOB  LLE Strength: hip 2/5, knee 3/5, ankle WFL  Skin integrity: bruising to L lateral hip with surgical dressing in place. Bandage soiled, nurse made aware      Functional Mobility:  Bed Mobility:     Rolling Right: minimum assistance and verbal cues to engage LLE in task  Scooting: contact guard assistance  Supine to Sit: contact guard assistance 1st trial with verbal cues to sequence, minimum assistance 2nd trial to reduce patient strain and risk of vasovagal episode  Sit to Supine: total assistance, for patient safety with return to supine with symptomatic hypotension  Transfers:     Sit to Stand:  minimum assistance with rolling walker  Bed to Chair: minimum assistance with  rolling walker  using  Step Transfer  Gait: deferred to to hypotension.  Balance: static sitting SBA. Static standing initially SBA progressed to Min A with symptomatic hypotension      AM-PAC 6 CLICK MOBILITY  Total Score:14     Treatment & Education:  PT instructed on positioning and muscle activation exercises to reduce edema and stiffness, improve strength and ROM, while promoting improved mobility.    Patient provided with verbal education and demonstrations education regarding PT role/goals/POC, post-op precautions, fall prevention, safety awareness, and discharge/DME recommendations.  Understanding was verbalized, however additional teaching warranted.     Patient left up in chair with Les elevated with all lines intact, call button in reach, nurse notified, and family present.      GOALS:   Multidisciplinary Problems       Physical Therapy Goals          Problem: Physical Therapy    Goal Priority Disciplines Outcome Interventions   Physical Therapy Goal     PT, PT/OT Progressing    Description: Goals to be met by: 08/16/2025     Patient will increase functional independence with mobility  by performin. Supine to sit with Modified [place]  2. Sit to stand transfer with Modified [place]  3. Bed to chair transfer with Modified [place] using Rolling Walker  4. Gait  x 150 feet with Modified [place] using Rolling Walker.   5. Stand for 10 minutes with Modified [place] using Rolling Walker  6. Pt able to navigate 3 steps without handrails with CGA.                         History:     Past Medical History:   Diagnosis Date    Hypertension     Mixed hyperlipidemia        Past Surgical History:   Procedure Laterality Date    VASCULAR SURGERY         Time Tracking:     PT Received On: 25  PT Start Time: 1001     PT Stop Time: 1032  PT Total Time (min): 31 min     Billable Minutes: Evaluation moderate      2025

## 2025-07-26 NOTE — PLAN OF CARE
Problem: Physical Therapy  Goal: Physical Therapy Goal  Description: Goals to be met by: 2025     Patient will increase functional independence with mobility by performin. Supine to sit with Modified [place]  2. Sit to stand transfer with Modified [place]  3. Bed to chair transfer with Modified [place] using Rolling Walker  4. Gait  x 150 feet with Modified [place] using Rolling Walker.   5. Stand for 10 minutes with Modified [place] using Rolling Walker  6. Pt able to navigate 3 steps without handrails with CGA.    Outcome: Progressing

## 2025-07-26 NOTE — PLAN OF CARE
Problem: Occupational Therapy  Goal: Occupational Therapy Goal  Description: LTG: Pt will perform basic ADLs and ADL transfers with Modified independence using LRAD by discharge.    STG: to be met by 8/9    Pt will complete grooming standing at sink with LRAD with SBA.  Pt will complete UB dressing with SBA.  Pt will complete LB dressing with SBA using LRAD.  Pt will complete toileting with SBA using LRAD.  Pt will complete functional mobility to/from toilet and toilet transfer with SBA using LRAD.    Outcome: Progressing

## 2025-07-26 NOTE — PLAN OF CARE
Problem: Hospitalized Older Adult  Goal: Optimal Cognitive Function  Outcome: Progressing  Goal: Effective Bowel Elimination  Outcome: Progressing  Goal: Optimal Coping  Outcome: Progressing  Goal: Fluid and Electrolyte Balance  Outcome: Progressing  Goal: Optimal Functional Ability  Outcome: Progressing  Goal: Improved Oral Intake  Outcome: Progressing  Goal: Adequate Sleep/Rest  Outcome: Progressing  Goal: Effective Urinary Elimination  Outcome: Progressing     Problem: Adult Inpatient Plan of Care  Goal: Plan of Care Review  Outcome: Progressing  Goal: Patient-Specific Goal (Individualized)  Outcome: Progressing  Goal: Absence of Hospital-Acquired Illness or Injury  Outcome: Progressing  Goal: Optimal Comfort and Wellbeing  Outcome: Progressing  Goal: Readiness for Transition of Care  Outcome: Progressing     Problem: Infection  Goal: Absence of Infection Signs and Symptoms  Outcome: Progressing     Problem: Wound  Goal: Optimal Coping  Outcome: Progressing  Goal: Optimal Functional Ability  Outcome: Progressing  Goal: Absence of Infection Signs and Symptoms  Outcome: Progressing  Goal: Improved Oral Intake  Outcome: Progressing  Goal: Optimal Pain Control and Function  Outcome: Progressing  Goal: Skin Health and Integrity  Outcome: Progressing  Goal: Optimal Wound Healing  Outcome: Progressing     Problem: Fall Injury Risk  Goal: Absence of Fall and Fall-Related Injury  Outcome: Progressing

## 2025-07-26 NOTE — OP NOTE
OPERATIVE REPORT     Patient: Panfilo Tolentino   : 1955    MRN: [id number]  Date: 2025        Surgeon:Braden Herring DO  Assistant: Jose Eduardo Douglas was essential, part of the procedure including deep hardware placement and deep closure.  No senior assistant was availible  Preoperative Diagnosis: : Closed left intertrochanteric femur fracture  Postoperative Diagnosis: Same  Procedure:  Treatment left intertrochanteric  femur fracture with intramedullary nail CPT 80248  Anesthesiologist: Melvin Guzman MD  OR Staff: Circulator: Yahir Ariza RN; Iwona Garcia RN  Physician Assistant: Kaylan Douglas PA-C  Radiology Technologist: Fan Brooks RT  Scrub Person: Ruby De La Cruz ST  Implants:   Implant Name Type Inv. Item Serial No.  Lot No. LRB No. Used Action   BLADE HELICAL PERF GOLD 95MM - [id number]   BLADE HELICAL PERF GOLD 95MM   SYNTHES [id number] Left 1 Implanted   NAIL IM FRANCISCA 130 DEG 11X400 L - [id number]   NAIL IM FRANCISCA 130 DEG 11X400 L   [employer] [id number] Left 1 Implanted   DEBORA REAM BALL TP [id number] - [id number]   DEBORA REAM BALL TP [id number]   [employer] [id number] Left 1 Implanted   WIRE GUIDE 3.2MM 400MM - [id number]   WIRE GUIDE 3.2MM 400MM   SYNTHES   Left 2 Implanted and Explanted   5.0mm x L 50mm XL 25 Locking Screw For IM Nail       SYNTHES [id number] Left 1 Implanted   5.0mm X 46MM L, XL 25 Locking screw for IM Nail       SYNTHES [id number] Left 1 Implanted      EBL:  350 cc  Complications: None  Disposition: To PACU, stable     Indications: Panfilo Tolentino is a 69 y.o. male presenting with the aforementioned injuries. The procedure is indicated to best obtain and maintain stability of the femur while allowing early ROM.  The patient is awake and alert. After thorough discussion of the risks, benefits, expected outcomes, and alternatives to surgical intervention, the patient agreed to proceed with surgical treatment.   Specific risks discussed included, but were not limited to: superficial or deep infection, wound healing complications, DVT/PE, significant bleeding requiring transfusion, damage to named anatomic structures in the immediate area including named neurovascular structures, implant failure, and general risks of anesthesia.  The patient voiced understanding and written as well as verbal consent was obtained by myself prior to the procedure.     Procedure in Detail:  The patient's left lower extremity was marked by me in the preoperative area.  He was taken to the operating room, and after satisfactory induction of anesthesia, the patient was placed in the supine position with a small bump under the ipsilateral hip.  perineal post placed all bony prominences well padded patient's boots were placed in the [place] table.  Patient received a seatbelt abdominal strap.  The ipsilateral lower extremity was then sterilely prepped and draped in the usual sterile fashion.  Standard time out procedure was performed confirming the correct surgeon, site, side, patient ID, procedure, and administration of antibiotics.      We are going to the procedure with the opening of the fracture on the [place] table.  Made a 4 cm incision laterally patient has a large greater trochanter fragment.  We then reduced this with a clamp began our nail     A 3 cm longitudinal incision was made just proximal to the greater trochanter.  The subcutaneous tissue and gluteal fascia were incised.  A threaded guide pin was then placed in the tip of the greater trochanter and extended and introduced into the proximal femur under AP and lateral fluoroscopy.  The Synthes one-step reamer was then used to ream proximally. A ball-tipped guide tootie was then placed through the entry site down to the physeal scar of the femur.  This was measured and then was reamed .  A Synthes TFN  appropiate size  was then passed over the guidewire, which was subsequently removed.  The  proximal interlocking device was then placed and a 2-cm longitudinal incision was made over the lateral aspect of the proximal thigh and the fascia tom was incised.  A threaded guide pin was then placed through the lateral cortex of the femur through the femoral neck and into the femoral head in the center-center position.  A helical blade was then placed under fluoroscopy and satisfactory position was noted on AP and lateral fluoroscopy and the setscrew was then set. The proximal interlocking device was then removed.     To stabilize the trochanter fracture we then placed a 6.5 partially-threaded cannulated screw from Synthes.     Distal interlocking screws were done with  two single lateral to medial interlocking screw under fluoroscopic guidance.  All wounds were then thoroughly irrigated.  Subcutaneous tissues were closed with interrupted inverted of 2-0 Monocryl.  Skin was approximated using skin staples.  Sterile dressing was applied.  General anesthesia was reversed and she was returned to the Postanesthesia Care Unit in stable condition.        All sponge and needle counts were correct at the end of the case.  I was present and participated in all aspects of the procedure.     Prognosis:  The patient will be kept WBAT on the ipsilateral extremity.  DVT prophylaxis is indicated for this patient and procedure.  The patient is at risk of pain, infection, and nonunion, and we will watch for all of these, amongst other issues, as the patient continues to heal.     Patient may need return to the OR for excisional debridement or washout if infection/skin necrosis is observed.        This note/OR report was created with the assistance of  voice recognition software or phone  dictation.  There may be transcription errors as a result of using this technology however minimal. Effort has been made to assure accuracy of transcription but any obvious errors or omissions should be clarified with the author of the  document.         Braden Herring,   Orthopedic Trauma Surgery

## 2025-07-27 LAB
BASOPHILS # BLD AUTO: 0.05 X10(3)/MCL
BASOPHILS NFR BLD AUTO: 0.5 %
EOSINOPHIL # BLD AUTO: 0.1 X10(3)/MCL (ref 0–0.9)
EOSINOPHIL NFR BLD AUTO: 1 %
ERYTHROCYTE [DISTWIDTH] IN BLOOD BY AUTOMATED COUNT: 12.9 % (ref 11.5–17)
FERRITIN SERPL-MCNC: 416.57 NG/ML (ref 21.81–274.66)
FOLATE SERPL-MCNC: 13.4 NG/ML (ref 7–31.4)
HCT VFR BLD AUTO: 28.5 % (ref 42–52)
HGB BLD-MCNC: 9.6 G/DL (ref 14–18)
HGB BLD-MCNC: 9.7 G/DL (ref 14–18)
IMM GRANULOCYTES # BLD AUTO: 0.04 X10(3)/MCL (ref 0–0.04)
IMM GRANULOCYTES NFR BLD AUTO: 0.4 %
IRON SATN MFR SERPL: 9 % (ref 20–50)
IRON SERPL-MCNC: 17 UG/DL (ref 65–175)
LYMPHOCYTES # BLD AUTO: 1.8 X10(3)/MCL (ref 0.6–4.6)
LYMPHOCYTES NFR BLD AUTO: 17.2 %
MCH RBC QN AUTO: 31.2 PG (ref 27–31)
MCHC RBC AUTO-ENTMCNC: 33.7 G/DL (ref 33–36)
MCV RBC AUTO: 92.5 FL (ref 80–94)
MONOCYTES # BLD AUTO: 0.96 X10(3)/MCL (ref 0.1–1.3)
MONOCYTES NFR BLD AUTO: 9.2 %
NEUTROPHILS # BLD AUTO: 7.52 X10(3)/MCL (ref 2.1–9.2)
NEUTROPHILS NFR BLD AUTO: 71.7 %
NRBC BLD AUTO-RTO: 0 %
PLATELET # BLD AUTO: 140 X10(3)/MCL (ref 130–400)
PMV BLD AUTO: 10.7 FL (ref 7.4–10.4)
RBC # BLD AUTO: 3.08 X10(6)/MCL (ref 4.7–6.1)
TIBC SERPL-MCNC: 183 UG/DL (ref 60–240)
TIBC SERPL-MCNC: 200 UG/DL (ref 250–450)
TRANSFERRIN SERPL-MCNC: 186 MG/DL (ref 163–344)
VIT B12 SERPL-MCNC: 524 PG/ML (ref 213–816)
WBC # BLD AUTO: 10.47 X10(3)/MCL (ref 4.5–11.5)

## 2025-07-27 PROCEDURE — 99900031 HC PATIENT EDUCATION (STAT)

## 2025-07-27 PROCEDURE — 99900035 HC TECH TIME PER 15 MIN (STAT)

## 2025-07-27 PROCEDURE — 11000001 HC ACUTE MED/SURG PRIVATE ROOM

## 2025-07-27 PROCEDURE — 63600175 PHARM REV CODE 636 W HCPCS: Performed by: STUDENT IN AN ORGANIZED HEALTH CARE EDUCATION/TRAINING PROGRAM

## 2025-07-27 PROCEDURE — 82746 ASSAY OF FOLIC ACID SERUM: CPT | Performed by: STUDENT IN AN ORGANIZED HEALTH CARE EDUCATION/TRAINING PROGRAM

## 2025-07-27 PROCEDURE — 85025 COMPLETE CBC W/AUTO DIFF WBC: CPT | Performed by: STUDENT IN AN ORGANIZED HEALTH CARE EDUCATION/TRAINING PROGRAM

## 2025-07-27 PROCEDURE — 94799 UNLISTED PULMONARY SVC/PX: CPT

## 2025-07-27 PROCEDURE — 36415 COLL VENOUS BLD VENIPUNCTURE: CPT | Performed by: STUDENT IN AN ORGANIZED HEALTH CARE EDUCATION/TRAINING PROGRAM

## 2025-07-27 PROCEDURE — 85018 HEMOGLOBIN: CPT | Performed by: STUDENT IN AN ORGANIZED HEALTH CARE EDUCATION/TRAINING PROGRAM

## 2025-07-27 PROCEDURE — 25000003 PHARM REV CODE 250: Performed by: STUDENT IN AN ORGANIZED HEALTH CARE EDUCATION/TRAINING PROGRAM

## 2025-07-27 RX ADMIN — PRAVASTATIN SODIUM 20 MG: 10 TABLET ORAL at 08:07

## 2025-07-27 RX ADMIN — SODIUM CHLORIDE 125 MG: 9 INJECTION, SOLUTION INTRAVENOUS at 02:07

## 2025-07-27 RX ADMIN — OXYCODONE HYDROCHLORIDE 10 MG: 10 TABLET ORAL at 06:07

## 2025-07-27 RX ADMIN — OXYCODONE HYDROCHLORIDE 10 MG: 10 TABLET ORAL at 01:07

## 2025-07-27 RX ADMIN — ENOXAPARIN SODIUM 40 MG: 40 INJECTION SUBCUTANEOUS at 05:07

## 2025-07-27 RX ADMIN — MORPHINE SULFATE 4 MG: 4 INJECTION, SOLUTION INTRAMUSCULAR; INTRAVENOUS at 06:07

## 2025-07-27 RX ADMIN — OXYCODONE HYDROCHLORIDE 10 MG: 10 TABLET ORAL at 09:07

## 2025-07-27 RX ADMIN — POLYETHYLENE GLYCOL 3350 17 G: 17 POWDER, FOR SOLUTION ORAL at 08:07

## 2025-07-27 RX ADMIN — OXYCODONE HYDROCHLORIDE 10 MG: 10 TABLET ORAL at 03:07

## 2025-07-27 RX ADMIN — LOSARTAN POTASSIUM 50 MG: 50 TABLET, FILM COATED ORAL at 08:07

## 2025-07-27 RX ADMIN — MORPHINE SULFATE 4 MG: 4 INJECTION, SOLUTION INTRAMUSCULAR; INTRAVENOUS at 10:07

## 2025-07-27 NOTE — CONSULTS
[facility] - 8th Floor Med Surg  Wound Care    Patient Name:  Panfilo Tolentino   MRN:  [id number]  Date: 7/27/2025  Diagnosis: Intertrochanteric fracture of left femur, closed, initial encounter    History:     Past Medical History:   Diagnosis Date    Hypertension     Mixed hyperlipidemia        Social History[1]    Precautions:     Allergies as of 07/25/2025    (No Known Allergies)       WOC Assessment Details/Treatment        07/27/25 1234   WOCN Assessment   Visit Date 07/27/25   Visit Time 1234   Consult Type New   WOCN Speciality Wound   Wound surgical   Intervention assessed;changed;applied;chart review;coordination of care;orders   Teaching on-going        Wound 07/25/25 1208 Incision Left lateral Thigh   Date First Assessed/Time First Assessed: 07/25/25 1208   Present on Original Admission: No  Primary Wound Type: Incision  Side: Left  Orientation: lateral  Location: Thigh  Closure Method: Staples  Additional Comments: xeroform, island dressing   Wound Image    Dressing Appearance Moist drainage   Drainage Amount Moderate   Drainage Characteristics/Odor Sanguineous   Appearance Staples intact   Wound Edges Approximated;Defined   Care Cleansed with:;Sterile normal saline   Dressing Changed;Absorptive Pad;Non-adherent;Other (comment)  (xeroform over incisions, quick clot over oozing puncture site)        Wound 07/27/25 Skin Tear Left lower Back   Date First Assessed: 07/27/25   Primary Wound Type: Skin Tear  Side: Left  Orientation: lower  Location: Back   Wound Image    Dressing Appearance Open to air   Drainage Amount None   Appearance Pink;Red   Tissue loss description Partial thickness   Black (%), Wound Tissue Color 0 %   Red (%), Wound Tissue Color 100 %   Yellow (%), Wound Tissue Color 0 %   Periwound Area Dry;Intact   Care Cleansed with:;Sterile normal saline   Dressing Applied;Foam     WOCN consulted for L hip surgical incisions. Patient awake, on BELLE bed, able to assist with  turns. Educated patient on type of wound care done and how it promotes healing. Patient verbalized understanding. Treatment recommendations: cleanse incision site with normal saline, dry well, cover incisions with xeroform, apply quick clot dressing over oozing puncture site, cover with abd pad, change daily and PRN soilage. Once puncture site no longer oozing, do not need to apply quick clot. L lower back: cleanse with normal saline, dry well, cover with foam dressing, change every two days. Wound care will follow.     07/27/2025         [1]   Social History  Socioeconomic History    Marital status:    Tobacco Use    Smoking status: Every Day     Current packs/day: 1.00     Types: Cigarettes    Smokeless tobacco: Never   Substance and Sexual Activity    Alcohol use: Yes    Drug use: Not Currently

## 2025-07-27 NOTE — PROGRESS NOTES
[facility] - 8th Floor Med Surg  Orthopedics  Progress Note    Patient Name: Panfilo Tolentino  MRN: [id number]  Admission Date: 7/25/2025  Hospital Length of Stay: 2 days  Attending Provider: Reyes, Thairy G, DO  Primary Care Provider: Karen, Primary Doctor  Follow-up For: Procedure(s) (LRB):  INSERTION, INTRAMEDULLARY IMPLANT, FEMUR (Left)    Post-Operative Day: 2 Day Post-Op  Subjective:     Principal Problem:Intertrochanteric fracture of left femur, closed, initial encounter    Principal Orthopedic Problem: L hip IT fx; IMN placement 7/25/25    Interval History:POD 2- Resting in bed comfortably. NAD; EULALIA KIRAN.  Working with PT. He denies any shortness of breath, fatigue or other signs of anemia when resting in bed this morning.    Review of patient's allergies indicates:  No Known Allergies    Current Facility-Administered Medications   Medication    acetaminophen tablet 650 mg    albuterol-ipratropium 2.5 mg-0.5 mg/3 mL nebulizer solution 3 mL    aluminum-magnesium hydroxide-simethicone 200-200-20 mg/5 mL suspension 30 mL    bisacodyL suppository 10 mg    dextrose 50% injection 12.5 g    dextrose 50% injection 25 g    enoxaparin injection 40 mg    glucagon (human recombinant) injection 1 mg    glucose chewable tablet 16 g    glucose chewable tablet 24 g    losartan tablet 50 mg    melatonin tablet 9 mg    morphine injection 4 mg    naloxone 0.4 mg/mL injection 0.02 mg    nicotine 21 mg/24 hr 1 patch    ondansetron disintegrating tablet 8 mg    ondansetron injection 4 mg    oxyCODONE immediate release tablet 5 mg    oxyCODONE immediate release tablet Tab 10 mg    polyethylene glycol packet 17 g    pravastatin tablet 20 mg    simethicone chewable tablet 80 mg    sodium chloride 0.9% flush 10 mL    tiZANidine tablet 4 mg     Objective:     Vital Signs (Most Recent):  Temp: 98.6 °F (37 °C) (07/27/25 0815)  Pulse: 73 (07/27/25 0815)  Resp: 18 (07/27/25 1009)  BP: (!) 157/73 (07/27/25 0815)  SpO2:  "(!) 93 % (07/27/25 0815) Vital Signs (24h Range):  Temp:  [97.6 °F (36.4 °C)-98.6 °F (37 °C)] 98.6 °F (37 °C)  Pulse:  [63-75] 73  Resp:  [15-18] 18  SpO2:  [93 %-98 %] 93 %  BP: (111-157)/(58-73) 157/73     Weight: 98.9 kg (218 lb)  Height: 6' 2" (188 cm)  Body mass index is 27.99 kg/m².      Intake/Output Summary (Last 24 hours) at 7/27/2025 1206  Last data filed at 7/27/2025 0615  Gross per 24 hour   Intake 480 ml   Output 2100 ml   Net -1620 ml       Physical exam:   MSK:  Left lower extremity compartments are soft and warm.  There are no signs or symptoms of a DVT or infection.  Dressings are clean dry and intact.  Tolerates gentle motion of the knee and hip.  Neurovascularly intact distally.    Significant Labs:   Recent Lab Results         07/27/25  0904   07/27/25  0550   07/26/25  1513        Albumin/Globulin Ratio     1.1       Albumin     3.3       ALP     65       ALT     15       Anion Gap     8.0       AST     22       Baso #   0.05   0.04       Basophil %   0.5   0.4       BILIRUBIN TOTAL     0.7       BUN     17.8       BUN/CREAT RATIO     21       Calcium     8.4       Chloride     101       CO2     28       Creatinine     0.84       eGFR     >60  Comment: Estimated GFR calculated using the CKD-EPI creatinine (2021) equation.       Eos #   0.10   0.05       Eos %   1.0   0.4       Ferritin     416.57       Folate 13.4           Globulin, Total     2.9       Glucose     126       Hematocrit   28.5   30.3       Hemoglobin   9.6   10.4       Immature Grans (Abs)   0.04   0.05       Immature Granulocytes   0.4   0.4       Iron     17       Iron Saturation     9       Lymph #   1.80   1.54       LYMPH %   17.2   13.7       Magnesium      1.80       MCH   31.2   31.7       MCHC   33.7   34.3       MCV   92.5   92.4       Mono #   0.96   0.91       Mono %   9.2   8.1       MPV   10.7   10.4       Neut #   7.52   8.68       Neut %   71.7   77.0       nRBC   0.0   0.0       Platelet Count   140   155       " Potassium     4.7       PROTEIN TOTAL     6.2       RBC   3.08   3.28       RDW   12.9   12.8       Sodium     137       TIBC     200       Transferrin     186       UIBC     183       Vitamin B12     524       WBC   10.47   11.27             All pertinent labs within the past 24 hours have been reviewed.    Significant Imaging: I have reviewed all pertinent imaging results/findings.    Assessment/Plan:     Active Diagnoses:    Diagnosis Date Noted POA    PRINCIPAL PROBLEM:  Intertrochanteric fracture of left femur, closed, initial encounter [S72.142A] 07/25/2025 Yes    Tobacco dependency [F17.200] 07/26/2025 Yes    Anemia [D64.9] 07/25/2025 Yes      Problems Resolved During this Admission:     69-year-old man with left hip IT fracture  -weight-bearing as tolerated.  Range of motion as tolerated to lower extremity  -ambulate with PT; work on meeting goals.  -completed postop antibiotics  -DVT prophylaxis: Lovenox  -change dressing daily starting on 07/27/2025  -ABLA: down trending but stable without symptoms at this time. Will monitor.      Park Chowdhury PA-C  Orthopedics  [facility] - 8th Floor Med Surg

## 2025-07-27 NOTE — PROGRESS NOTES
[facility] - 8th Floor [facility] MEDICINE ~ PROGRESS NOTE    CHIEF COMPLAINT   Hospital follow up    HOSPITAL COURSE   69-year-old male with a past medical history of hypertension, tobacco dependence who was walking on wet floor when he slipped and fell.  Denies syncope, head injury, loss of sphincter control, tongue biting.  He was unable to get up after therefore was brought to the emergency room where he was found to have a left intertrochanteric femur fracture.     At baseline the patient lives with his wife and is independent.    Today  Doing well denies any complaints.  Hemoglobin down to 9.6 however blood pressure is holding.  Iron panel reviewed.  Started Ferrlecit.  OBJECTIVE/PHYSICAL EXAM     VITAL SIGNS (MOST RECENT):  Temp: 98.6 °F (37 °C) (07/27/25 0815)  Pulse: 73 (07/27/25 0815)  Resp: 18 (07/27/25 1009)  BP: (!) 157/73 (07/27/25 0815)  SpO2: (!) 93 % (07/27/25 0815) VITAL SIGNS (24 HOUR RANGE):  Temp:  [98.2 °F (36.8 °C)-98.6 °F (37 °C)] 98.6 °F (37 °C)  Pulse:  [66-75] 73  Resp:  [15-18] 18  SpO2:  [93 %-98 %] 93 %  BP: (111-157)/(58-73) 157/73   GENERAL: In no acute distress, afebrile  HEENT:  PERRLA  CHEST: Clear to auscultation bilaterally  HEART: S1, S2, no appreciable murmur  ABDOMEN: Soft, nontender, BS +  MSK:  Bandaging along left hip  NEUROLOGIC: Alert and oriented x4, moving all extremities with good strength     ASSESSMENT/PLAN   Intertrochanteric proximal femur fracture   ABLA  -status post left intertrochanteric femur fracture with IM nail on 07/25/2025 with Dr. Braden Herring  -weight-bearing as tolerated per Orthopedic surgery  -pain control, bowel regimen as needed   -PT/OT when okay with Orthopedic surgery  -Ferrlecit x3     Primary hypertension   + orthostatics  -resume home losartan  -suspect patient needs transfusion however lab has yet to draw CBC     Tobacco dependence   -1 pack per day, p.r.n. nicotine patch     DVT prophylaxis:  Lovenox  Anticipated  discharge and disposition:  TBD  __________________________________________________________________________    NUTRITIONAL STATUS     Patient meets ASPEN criteria for   malnutrition of   per RD assessment as evidenced by:                       A minimum of two characteristics is recommended for diagnosis of either severe or non-severe malnutrition.     LABS/MICRO/MEDS/DIAGNOSTICS       LABS  Recent Labs     07/26/25  1513      K 4.7   CO2 28   BUN 17.8   CREATININE 0.84   CALCIUM 8.4*   ALKPHOS 65   AST 22   ALT 15   ALBUMIN 3.3*     Recent Labs     07/27/25  0550   WBC 10.47   RBC 3.08*   HCT 28.5*   MCV 92.5          MICROBIOLOGY  Microbiology Results (last 7 days)       ** No results found for the last 168 hours. **               MEDICATIONS   enoxparin  40 mg Subcutaneous Daily    ferric gluconate (Ferrlecit) IVPB  125 mg Intravenous Daily    losartan  50 mg Oral Daily    pravastatin  20 mg Oral Daily         INFUSIONS         DIAGNOSTIC TESTS  X-Ray Elbow Complete Left   Final Result      X-Ray Femur 2 View Left   Final Result      As above.         Electronically signed by: Tc Enriquez   Date:    07/25/2025   Time:    13:56      SURG FL Surgery Fluoro Usage   Final Result      X-Ray Chest 1 View   Final Result      No acute cardiopulmonary process identified.         Electronically signed by: Irving Lilly   Date:    07/25/2025   Time:    09:14      X-Ray Hip 2 or 3 views Left with Pelvis when performed   Final Result           No echocardiogram results found for the past 14 days.         Case related differential diagnoses have been reviewed; assessment and plan has been documented. I have personally reviewed the labs and test results that are currently available; I have reviewed the patients medication list. I have reviewed the consulting providers recommendations. I have reviewed or attempted to review medical records based upon their availability.  All of the patient's and/or family's  questions have been addressed and answered to the best of my ability.  I will continue to monitor closely and make adjustments to medical management as needed.  This document was created using M*Modal Fluency Direct.  Transcription errors may have been made.  Please contact me if any questions may rise regarding documentation to clarify transcription.        Thairy G Reyes, DO   Internal Medicine  Department of Hospital Medicine  [facility] - 8th Floor Med Surg

## 2025-07-28 LAB
BASOPHILS # BLD AUTO: 0.06 X10(3)/MCL
BASOPHILS NFR BLD AUTO: 0.6 %
EOSINOPHIL # BLD AUTO: 0.15 X10(3)/MCL (ref 0–0.9)
EOSINOPHIL NFR BLD AUTO: 1.4 %
ERYTHROCYTE [DISTWIDTH] IN BLOOD BY AUTOMATED COUNT: 12.8 % (ref 11.5–17)
HCT VFR BLD AUTO: 28.2 % (ref 42–52)
HGB BLD-MCNC: 9.5 G/DL (ref 14–18)
IMM GRANULOCYTES # BLD AUTO: 0.06 X10(3)/MCL (ref 0–0.04)
IMM GRANULOCYTES NFR BLD AUTO: 0.6 %
LYMPHOCYTES # BLD AUTO: 1.44 X10(3)/MCL (ref 0.6–4.6)
LYMPHOCYTES NFR BLD AUTO: 13.5 %
MCH RBC QN AUTO: 31 PG (ref 27–31)
MCHC RBC AUTO-ENTMCNC: 33.7 G/DL (ref 33–36)
MCV RBC AUTO: 92.2 FL (ref 80–94)
MONOCYTES # BLD AUTO: 0.79 X10(3)/MCL (ref 0.1–1.3)
MONOCYTES NFR BLD AUTO: 7.4 %
NEUTROPHILS # BLD AUTO: 8.13 X10(3)/MCL (ref 2.1–9.2)
NEUTROPHILS NFR BLD AUTO: 76.5 %
NRBC BLD AUTO-RTO: 0 %
PLATELET # BLD AUTO: 160 X10(3)/MCL (ref 130–400)
PMV BLD AUTO: 11.2 FL (ref 7.4–10.4)
RBC # BLD AUTO: 3.06 X10(6)/MCL (ref 4.7–6.1)
WBC # BLD AUTO: 10.63 X10(3)/MCL (ref 4.5–11.5)

## 2025-07-28 PROCEDURE — 11000001 HC ACUTE MED/SURG PRIVATE ROOM

## 2025-07-28 PROCEDURE — 25000003 PHARM REV CODE 250: Performed by: STUDENT IN AN ORGANIZED HEALTH CARE EDUCATION/TRAINING PROGRAM

## 2025-07-28 PROCEDURE — 97535 SELF CARE MNGMENT TRAINING: CPT | Mod: CO

## 2025-07-28 PROCEDURE — 97530 THERAPEUTIC ACTIVITIES: CPT | Mod: CQ

## 2025-07-28 PROCEDURE — 97116 GAIT TRAINING THERAPY: CPT | Mod: CQ

## 2025-07-28 PROCEDURE — 36415 COLL VENOUS BLD VENIPUNCTURE: CPT | Performed by: STUDENT IN AN ORGANIZED HEALTH CARE EDUCATION/TRAINING PROGRAM

## 2025-07-28 PROCEDURE — 85025 COMPLETE CBC W/AUTO DIFF WBC: CPT | Performed by: STUDENT IN AN ORGANIZED HEALTH CARE EDUCATION/TRAINING PROGRAM

## 2025-07-28 PROCEDURE — 63600175 PHARM REV CODE 636 W HCPCS: Performed by: STUDENT IN AN ORGANIZED HEALTH CARE EDUCATION/TRAINING PROGRAM

## 2025-07-28 RX ORDER — AMOXICILLIN 250 MG
1 CAPSULE ORAL 2 TIMES DAILY
Status: DISCONTINUED | OUTPATIENT
Start: 2025-07-28 | End: 2025-07-31 | Stop reason: HOSPADM

## 2025-07-28 RX ADMIN — OXYCODONE HYDROCHLORIDE 10 MG: 10 TABLET ORAL at 03:07

## 2025-07-28 RX ADMIN — SENNOSIDES AND DOCUSATE SODIUM 1 TABLET: 50; 8.6 TABLET ORAL at 12:07

## 2025-07-28 RX ADMIN — LOSARTAN POTASSIUM 50 MG: 50 TABLET, FILM COATED ORAL at 09:07

## 2025-07-28 RX ADMIN — SODIUM CHLORIDE 125 MG: 9 INJECTION, SOLUTION INTRAVENOUS at 09:07

## 2025-07-28 RX ADMIN — POLYETHYLENE GLYCOL 3350 17 G: 17 POWDER, FOR SOLUTION ORAL at 10:07

## 2025-07-28 RX ADMIN — OXYCODONE HYDROCHLORIDE 10 MG: 10 TABLET ORAL at 12:07

## 2025-07-28 RX ADMIN — SENNOSIDES AND DOCUSATE SODIUM 1 TABLET: 50; 8.6 TABLET ORAL at 10:07

## 2025-07-28 RX ADMIN — OXYCODONE HYDROCHLORIDE 10 MG: 10 TABLET ORAL at 11:07

## 2025-07-28 RX ADMIN — PRAVASTATIN SODIUM 20 MG: 10 TABLET ORAL at 09:07

## 2025-07-28 NOTE — PT/OT/SLP PROGRESS
Physical Therapy Treatment    Patient Name:  Panfilo Tolentino   MRN:  [id number]    Recommendations:     Discharge therapy intensity: Low Intensity Therapy   Discharge Equipment Recommendations: wheelchair  Barriers to discharge:     Assessment:     Panfilo Tolentino is a 69 y.o. male admitted with a medical diagnosis of L intertrochanteric femur fracture s/p IM nail (7/25/25) related to slip and fall at home. .  He presents with the following impairments/functional limitations: weakness, impaired endurance, impaired functional mobility, gait instability, impaired balance, decreased lower extremity function, pain, edema, impaired cardiopulmonary response to activity .    Pt progressing well with PT. Negative for OH initially but did have drop in BP during ambulation. Pt c/o dizziness and requested to sit.   Pt able to perform bed mobility with only assist managing LLE. Afterwards, pt able to T/F to toilet and ambulate 10ft/16ft with RW CGA. Pt with no LOB noted.     Reached out to MD via secure chat to discuss d/c planning. MD in agreement for pt to stay a couple more days to cont to progress with therapy and become medically stable. Plan to continue gait training as well as stair training 2/2 pt having 2 steps to enter his home.     DME Justification:  Panfilo Tolentino has a mobility limitation that significantly impairs his ability to participate in one or more mobility related activities of daily living (MRADLs) such as toileting, feeding, dressing, grooming, and bathing in customary locations in the home.  The mobility limitation cannot be sufficiently resolved by the use of a cane or walker.   The use of a manual wheelchair will significantly improve the patients ability to participate in MRADLS and the patient will use it on regular basis in the home.  Panfilo Tolentino has expressed his willingness to use a manual wheelchair in the home. Patients upper body strength is sufficient for propulsion.  He  also has a caregiver who is available, willing, and able to provide assistance with the wheelchair when needed.      Rehab Prognosis: Good; patient would benefit from acute skilled PT services to address these deficits and reach maximum level of function.    Recent Surgery: Procedure(s) (LRB):  INSERTION, INTRAMEDULLARY IMPLANT, FEMUR (Left) 3 Days Post-Op    Plan:     During this hospitalization, patient would benefit from acute PT services 6 x/week to address the identified rehab impairments via gait training, therapeutic activities, therapeutic exercises and progress toward the following goals:    Plan of Care Expires:  25    Subjective     Chief Complaint:   Patient/Family Comments/goals:   Pain/Comfort:  Location - Side 1: Left  Location 1: leg  Pain Addressed 1: Reposition, Distraction      Objective:     Communicated with NSG prior to session.  Patient found HOB elevated with peripheral IV upon PT entry to room.     General Precautions: Standard, fall  Orthopedic Precautions: LLE weight bearing as tolerated (FROM)  Braces: N/A  Respiratory Status: Room air    Blood Pressure:   Prior to activity: 126/66 with HR of 65  Sittin/63 with HR of 73  Standin/54 with HR of 81  After amb trial: (pt c/o dizziness): 103/51 with HR of 52    Skin Integrity: drainage from incision through bandage and onto javed, NSG aware.       Functional Mobility:  Bed Mobility:     Transfers:     Sit to Stand:  stand by assistance with rolling walker and 1 trial from EOB, 1 trial form bedside chair, 1 trial from toilet   Bed to Chair: contact guard assistance with  rolling walker  using  Step Transfer  Toilet Transfer: contact guard assistance with  rolling walker  using  Step Transfer  Gait: pt amb 10ft/16ft with RW CGA. Pt with step-to gait pattern. Unable to adequately flex L knee. Seated rest between trials.       Co-Treatment: Yes, due to 2/2 to +OH last session     Education:  Patient and family were provided with  verbal education education regarding PT role/goals/POC, safety awareness, and discharge/DME recommendations.  Additional teaching is warranted.     Patient left up in chair with call button in reach      GOALS:   Multidisciplinary Problems       Physical Therapy Goals          Problem: Physical Therapy    Goal Priority Disciplines Outcome Interventions   Physical Therapy Goal     PT, PT/OT Progressing    Description: Goals to be met by: 2025     Patient will increase functional independence with mobility by performin. Supine to sit with Modified [place]  2. Sit to stand transfer with Modified [place]  3. Bed to chair transfer with Modified [place] using Rolling Walker  4. Gait  x 150 feet with Modified [place] using Rolling Walker.   5. Stand for 10 minutes with Modified [place] using Rolling Walker  6. Pt able to navigate 3 steps without handrails with CGA.                         Time Tracking:     PT Received On: 25  PT Start Time: 952     PT Stop Time: 0  PT Total Time (min): 38 min     Billable Minutes: Gait Training 23 and Therapeutic Activity 15    Treatment Type: Treatment  PT/PTA: PTA     Number of PTA visits since last PT visit: 2025

## 2025-07-28 NOTE — PT/OT/SLP PROGRESS
Occupational Therapy   Treatment    Name: Panfilo Tolentino  MRN: [id number]    Recommendations:     Recommended therapy intensity at discharge: Low Intensity Therapy   Discharge Equipment Recommendations:  wheelchair  Barriers to discharge:       Assessment:     Panfilo Tolentino is a 69 y.o. male with a medical diagnosis of L intertrochanteric femur fracture s/p IM nail (7/25/25) related to slip and fall at home. Performance deficits affecting function are weakness, impaired endurance, impaired self care skills, impaired functional mobility, gait instability, impaired balance, decreased safety awareness, pain, impaired cardiopulmonary response to activity, orthopedic precautions.     Pt tolerated OT session well. Able to sit EOB with Min A for management of LLE. Pt then transferred to chair and ambulated to bathroom with CGA-SBA and RW. No LOB noted. BM remained WNL during all transitions however did have some dizziness towards end of session. Recommending low intensity therapy at d/c. Pt has all needed DME besides w/c for longer distances. CM notified.     DME Justification: Panfilo Tolentino has a mobility limitation that significantly impairs his ability to participate in one or more mobility related activities of daily living (MRADLs) such as toileting, feeding, dressing, grooming, and bathing in customary locations in the home.  The mobility limitation cannot be sufficiently resolved by the use of a cane or walker.   The use of a manual wheelchair will significantly improve the patients ability to participate in MRADLS and the patient will use it on regular basis in the home.  Panfilo Tolentino has expressed his willingness to use a manual wheelchair in the home. Patients upper body strength is sufficient for propulsion.  He also has a caregiver who is available, willing, and able to provide assistance with the wheelchair when needed.      Rehab Prognosis:  Good; patient would benefit from acute  skilled OT services to address these deficits and reach maximum level of function.       Plan:     Patient to be seen 6 x/week to address the above listed problems via self-care/home management, therapeutic activities, therapeutic exercises  Plan of Care Expires: 08/23/25  Plan of Care Reviewed with: patient, family    Subjective     Pain/Comfort:  Location - Side 1: Left  Location 1: leg  Pain Addressed 1: Reposition, Distraction    Objective:     Communicated with: RN prior to session.  Patient found HOB elevated with peripheral IV upon OT entry to room.    General Precautions: Standard, fall    Orthopedic Precautions:LLE weight bearing as tolerated (full ROM)  Braces: N/A  Respiratory Status: Room air  Vital Signs: Orthostatics: Supine 126/66 HR 65, Sitting 147/63 HR 73, Standing 121/54 HR 81 103/51 end of session w/ c/o dizziness.      Occupational Performance:     Functional Mobility/Transfers:  Bed mobility:    Scooting: stand by assistance  Supine to Sit: minimum assistance and for management of LLE  Transfers: Sit to Stand: stand by assistance with rolling walker  Bed to Chair: contact guard assistance with rolling walker using Step Transfer  Toilet Transfer: contact guard assistance with rolling walker using Step Transfer  Pt ambulated to bathroom with CGA and RW. No LOB.     Activities of Daily Living:  Toileting: contact guard assistance toilet t/f with BSC placed on top for increased height. Able to void.   Pt and family inquired about AE for donning/doffing socks. Will bring next session and demo equipment.     Balance:   Static Sitting Balance: No UE support: Normal: Patient able to maintain steady balance without handhold support.  Dynamic Sitting Balance: No UE support: Normal: Patient accepts maximal challenge and can shift weight easily within full range in all directions.    Therapeutic Positioning    OT interventions performed during the course of today's session in an effort to prevent and/or  reduce acquired pressure injuries:   Education was provided on benefits of and recommendations for therapeutic positioning    [facility] 6 Click ADL: 22    Co-Treatment: Yes, due to +OH previous session    Patient Education:  Patient and family were provided with verbal education education regarding OT role/goals/POC, fall prevention, safety awareness, and Discharge/DME recommendations.  Understanding was verbalized.      Patient left up in chair with all lines intact, call button in reach, and family present.    GOALS:   Multidisciplinary Problems       Occupational Therapy Goals          Problem: Occupational Therapy    Goal Priority Disciplines Outcome Interventions   Occupational Therapy Goal     OT, PT/OT Progressing    Description: LTG: Pt will perform basic ADLs and ADL transfers with Modified independence using LRAD by discharge.    STG: to be met by 8/9    Pt will complete grooming standing at sink with LRAD with SBA.  Pt will complete UB dressing with SBA.  Pt will complete LB dressing with SBA using LRAD.  Pt will complete toileting with SBA using LRAD.  Pt will complete functional mobility to/from toilet and toilet transfer with SBA using LRAD.                         Time Tracking:     OT Date of Treatment: 07/28/25  OT Start Time: 0952  OT Stop Time: 1031  OT Total Time (min): 39 min    Billable Minutes:Self Care/Home Management 39    OTR/L readily available for conference at the time of the provision of services: Alicia Lejeune, LOTR  OT/DALLIN: DALLIN     Number of DALLIN visits since last OT visit: 1 7/28/2025

## 2025-07-28 NOTE — PROGRESS NOTES
[facility] - 8th Floor Med Surg  Orthopedics  Progress Note    Patient Name: Panfilo Tolentino  MRN: [id number]  Admission Date: 7/25/2025  Hospital Length of Stay: 3 days  Attending Provider: Reyes, Thairy G, DO  Primary Care Provider: Pierre Ledesma MD  Follow-up For: Procedure(s) (LRB):  INSERTION, INTRAMEDULLARY IMPLANT, FEMUR (Left)    Post-Operative Day: 2 Day Post-Op  Subjective:     Principal Problem:Intertrochanteric fracture of left femur, closed, initial encounter    Principal Orthopedic Problem: L hip IT fx; IMN placement 7/25/25    Interval History:POD 3 Resting in bed comfortably. States he was able to get to chair with therapy and walk well with walker today--he is sore with mobility but pain controlled at rest.     Review of patient's allergies indicates:  No Known Allergies    Current Facility-Administered Medications   Medication    acetaminophen tablet 650 mg    albuterol-ipratropium 2.5 mg-0.5 mg/3 mL nebulizer solution 3 mL    aluminum-magnesium hydroxide-simethicone 200-200-20 mg/5 mL suspension 30 mL    bisacodyL suppository 10 mg    dextrose 50% injection 12.5 g    dextrose 50% injection 25 g    enoxaparin injection 40 mg    ferric gluconate (Ferrlecit) 125 mg in 0.9% NaCl 110 mL IVPB    glucagon (human recombinant) injection 1 mg    glucose chewable tablet 16 g    glucose chewable tablet 24 g    losartan tablet 50 mg    melatonin tablet 9 mg    morphine injection 4 mg    naloxone 0.4 mg/mL injection 0.02 mg    nicotine 21 mg/24 hr 1 patch    ondansetron disintegrating tablet 8 mg    ondansetron injection 4 mg    oxyCODONE immediate release tablet 5 mg    oxyCODONE immediate release tablet Tab 10 mg    polyethylene glycol packet 17 g    pravastatin tablet 20 mg    senna-docusate 8.6-50 mg per tablet 1 tablet    simethicone chewable tablet 80 mg    sodium chloride 0.9% flush 10 mL    tiZANidine tablet 4 mg     Objective:     Vital Signs (Most Recent):  Temp: 98.1 °F  "(36.7 °C) (07/28/25 1131)  Pulse: 78 (07/28/25 1131)  Resp: 18 (07/28/25 1243)  BP: (!) 162/74 (07/28/25 1131)  SpO2: (!) 92 % (07/28/25 1131) Vital Signs (24h Range):  Temp:  [97.8 °F (36.6 °C)-99 °F (37.2 °C)] 98.1 °F (36.7 °C)  Pulse:  [66-78] 78  Resp:  [18] 18  SpO2:  [92 %-98 %] 92 %  BP: (124-162)/(61-74) 162/74     Weight: 98.9 kg (218 lb)  Height: 6' 2" (188 cm)  Body mass index is 27.99 kg/m².      Intake/Output Summary (Last 24 hours) at 7/28/2025 1327  Last data filed at 7/28/2025 0630  Gross per 24 hour   Intake 760 ml   Output 2525 ml   Net -1765 ml       Physical exam:   MSK:  Left lower extremity compartments are soft and warm.  There are no signs or symptoms of a DVT or infection.  Dressings are clean dry and intact.  Tolerates gentle motion of the knee and hip.  Neurovascularly intact distally.    Significant Labs:   Recent Lab Results         07/28/25  0436   07/27/25  1525        Baso # 0.06         Basophil % 0.6         Eos # 0.15         Eos % 1.4         Hematocrit 28.2         Hemoglobin 9.5   9.7       Immature Grans (Abs) 0.06         Immature Granulocytes 0.6         Lymph # 1.44         LYMPH % 13.5         MCH 31.0         MCHC 33.7         MCV 92.2         Mono # 0.79         Mono % 7.4         MPV 11.2         Neut # 8.13         Neut % 76.5         nRBC 0.0         Platelet Count 160         RBC 3.06         RDW 12.8         WBC 10.63               All pertinent labs within the past 24 hours have been reviewed.    Significant Imaging: I have reviewed all pertinent imaging results/findings.    Assessment/Plan:     Active Diagnoses:    Diagnosis Date Noted POA    PRINCIPAL PROBLEM:  Intertrochanteric fracture of left femur, closed, initial encounter [S72.142A] 07/25/2025 Yes    Tobacco dependency [F17.200] 07/26/2025 Yes    Anemia [D64.9] 07/25/2025 Yes      Problems Resolved During this Admission:     69-year-old man with left hip IT fracture  -weight-bearing as tolerated.  Range of " motion as tolerated to lower extremity  -ambulate with PT; likely placement   -completed postop antibiotics  -DVT prophylaxis: Lovenox  -change dressing daily   -ABLA:  stable without symptoms at this time. Will monitor.  Follow up in Dr Romeo office in 3 weeks       NADINE Hoffman  Orthopedics  [facility] - 8th Floor Med Surg

## 2025-07-28 NOTE — PLAN OF CARE
07/28/25 1258   Discharge Assessment   Assessment Type Discharge Planning Assessment   Confirmed/corrected address, phone number and insurance Yes   Confirmed Demographics Correct on Facesheet   Source of Information patient   Communicated GEOVANNI with patient/caregiver Date not available/Unable to determine   People in Home spouse;friend(s)   Name(s) of People in Home Dayanna Tolentino (spouse) & Virginia Davi (friend)   Do you expect to return to your current living situation? Yes   Do you have help at home or someone to help you manage your care at home? Yes   Who are your caregiver(s) and their phone number(s)? Dayanna Tolentino [phone]   Prior to hospitilization cognitive status: Unable to Assess   Current cognitive status: Alert/Oriented   Walking or Climbing Stairs Difficulty no   Dressing/Bathing Difficulty no   Home Accessibility stairs to enter home   Number of Stairs, Main Entrance two   Stair Railings, Main Entrance none   Home Layout Able to live on 1st floor   Equipment Currently Used at Home none   Readmission within 30 days? No   Patient currently being followed by outpatient case management? No   Do you currently have service(s) that help you manage your care at home? No   Do you take prescription medications? Yes   Do you have prescription coverage? Yes   Do you have any problems affording any of your prescribed medications? No   Is the patient taking medications as prescribed? yes   Who is going to help you get home at discharge? Wilmer Tolentino   How do you get to doctors appointments? car, drives self   Are you on dialysis? No   Do you take coumadin? No   Discharge Plan A Home Health   Discharge Plan B Home Health   DME Needed Upon Discharge  other (see comments)  (tbd)   Discharge Plan discussed with: Patient   Transition of Care Barriers None     Assessment done with pt in bed. No HH. No DME. Pt uses [facility] pharmacy at [place] [address]. Patient choice list presented to pt but he said I'll need to  speak to his wife, Dayanna. Will follow up at a later time.

## 2025-07-28 NOTE — PROGRESS NOTES
[facility] - 8th Floor [facility] MEDICINE ~ PROGRESS NOTE    CHIEF COMPLAINT   Hospital follow up    HOSPITAL COURSE   69-year-old male with a past medical history of hypertension, tobacco dependence who was walking on wet floor when he slipped and fell.  Denies syncope, head injury, loss of sphincter control, tongue biting.  He was unable to get up after therefore was brought to the emergency room where he was found to have a left intertrochanteric femur fracture.     At baseline the patient lives with his wife and is independent.    Today  Doing well denies any complaints.  Hemoglobin holding steady, receiving 3 total doses of Ferrlecit.  Physical therapy contacted me today requesting if patient can say for an additional 2 days able continued working with him and he can likely discharge to home.  Given that he requires 2 more units of Ferrlecit anyway, reasonable plan.  OBJECTIVE/PHYSICAL EXAM     VITAL SIGNS (MOST RECENT):  Temp: 98.1 °F (36.7 °C) (07/28/25 1531)  Pulse: 74 (07/28/25 1531)  Resp: 18 (07/28/25 1243)  BP: 131/80 (07/28/25 1531)  SpO2: 97 % (07/28/25 1531) VITAL SIGNS (24 HOUR RANGE):  Temp:  [97.8 °F (36.6 °C)-99 °F (37.2 °C)] 98.1 °F (36.7 °C)  Pulse:  [66-78] 74  Resp:  [18] 18  SpO2:  [92 %-97 %] 97 %  BP: (124-162)/(61-80) 131/80   GENERAL: In no acute distress, afebrile  HEENT:  PERRLA  CHEST: Clear to auscultation bilaterally  HEART: S1, S2, no appreciable murmur  ABDOMEN: Soft, nontender, BS +  MSK:  Bandaging along left hip  NEUROLOGIC: Alert and oriented x4, moving all extremities with good strength     ASSESSMENT/PLAN   Intertrochanteric proximal femur fracture   ABLA  -status post left intertrochanteric femur fracture with IM nail on 07/25/2025 with Dr. Braden Herring  -weight-bearing as tolerated per Orthopedic surgery  -pain control, bowel regimen as needed   -PT/OT when okay with Orthopedic surgery  -Ferrlecit x3     Primary hypertension   +  orthostatics-improving  -tolerating home losartan    Tobacco dependence   -1 pack per day, p.r.n. nicotine patch     DVT prophylaxis:  Lovenox  Anticipated discharge and disposition:  Discharge to home on Thursday  __________________________________________________________________________    NUTRITIONAL STATUS     Patient meets ASPEN criteria for   malnutrition of   per RD assessment as evidenced by:                       A minimum of two characteristics is recommended for diagnosis of either severe or non-severe malnutrition.     LABS/MICRO/MEDS/DIAGNOSTICS       LABS  Recent Labs     07/26/25  1513      K 4.7   CO2 28   BUN 17.8   CREATININE 0.84   CALCIUM 8.4*   ALKPHOS 65   AST 22   ALT 15   ALBUMIN 3.3*     Recent Labs     07/28/25  0436   WBC 10.63   RBC 3.06*   HCT 28.2*   MCV 92.2          MICROBIOLOGY  Microbiology Results (last 7 days)       ** No results found for the last 168 hours. **               MEDICATIONS   enoxparin  40 mg Subcutaneous Daily    ferric gluconate (Ferrlecit) IVPB  125 mg Intravenous Daily    losartan  50 mg Oral Daily    pravastatin  20 mg Oral Daily    senna-docusate  1 tablet Oral BID         INFUSIONS         DIAGNOSTIC TESTS  X-Ray Elbow Complete Left   Final Result      X-Ray Femur 2 View Left   Final Result      As above.         Electronically signed by: Tc Enriquez   Date:    07/25/2025   Time:    13:56      SURG FL Surgery Fluoro Usage   Final Result      X-Ray Chest 1 View   Final Result      No acute cardiopulmonary process identified.         Electronically signed by: Irving Lilly   Date:    07/25/2025   Time:    09:14      X-Ray Hip 2 or 3 views Left with Pelvis when performed   Final Result           No echocardiogram results found for the past 14 days.         Case related differential diagnoses have been reviewed; assessment and plan has been documented. I have personally reviewed the labs and test results that are currently available; I have reviewed  the patients medication list. I have reviewed the consulting providers recommendations. I have reviewed or attempted to review medical records based upon their availability.  All of the patient's and/or family's questions have been addressed and answered to the best of my ability.  I will continue to monitor closely and make adjustments to medical management as needed.  This document was created using M*Modal Fluency Direct.  Transcription errors may have been made.  Please contact me if any questions may rise regarding documentation to clarify transcription.        Thairy G Reyes,    Internal Medicine  Department of Hospital Medicine  [facility] - 8th Floor Med Surg

## 2025-07-28 NOTE — PLAN OF CARE
Problem: Hospitalized Older Adult  Goal: Optimal Cognitive Function  Outcome: Ongoing  Goal: Effective Bowel Elimination  Outcome: Ongoing  Goal: Optimal Coping  Outcome: Ongoing  Goal: Fluid and Electrolyte Balance  Outcome: Ongoing  Goal: Optimal Functional Ability  Outcome: Ongoing  Goal: Improved Oral Intake  Outcome: Ongoing  Goal: Adequate Sleep/Rest  Outcome: Ongoing  Goal: Effective Urinary Elimination  Outcome: Ongoing     Problem: Adult Inpatient Plan of Care  Goal: Plan of Care Review  Outcome: Ongoing  Goal: Patient-Specific Goal (Individualized)  Outcome: Ongoing  Goal: Absence of Hospital-Acquired Illness or Injury  Outcome: Ongoing  Goal: Optimal Comfort and Wellbeing  Outcome: Ongoing  Goal: Readiness for Transition of Care  Outcome: Ongoing     Problem: Infection  Goal: Absence of Infection Signs and Symptoms  Outcome: Ongoing     Problem: Wound  Goal: Optimal Coping  Outcome: Ongoing  Goal: Optimal Functional Ability  Outcome: Ongoing  Goal: Absence of Infection Signs and Symptoms  Outcome: Ongoing  Goal: Improved Oral Intake  Outcome: Ongoing  Goal: Optimal Pain Control and Function  Outcome: Ongoing  Goal: Skin Health and Integrity  Outcome: Ongoing  Goal: Optimal Wound Healing  Outcome: Ongoing     Problem: Fall Injury Risk  Goal: Absence of Fall and Fall-Related Injury  Outcome: Ongoing

## 2025-07-29 LAB
HCT VFR BLD AUTO: 28 % (ref 42–52)
HGB BLD-MCNC: 9.4 G/DL (ref 14–18)

## 2025-07-29 PROCEDURE — 25000003 PHARM REV CODE 250: Performed by: STUDENT IN AN ORGANIZED HEALTH CARE EDUCATION/TRAINING PROGRAM

## 2025-07-29 PROCEDURE — 99900035 HC TECH TIME PER 15 MIN (STAT)

## 2025-07-29 PROCEDURE — 36415 COLL VENOUS BLD VENIPUNCTURE: CPT | Performed by: STUDENT IN AN ORGANIZED HEALTH CARE EDUCATION/TRAINING PROGRAM

## 2025-07-29 PROCEDURE — 63600175 PHARM REV CODE 636 W HCPCS: Performed by: STUDENT IN AN ORGANIZED HEALTH CARE EDUCATION/TRAINING PROGRAM

## 2025-07-29 PROCEDURE — 97530 THERAPEUTIC ACTIVITIES: CPT | Mod: CQ

## 2025-07-29 PROCEDURE — 97116 GAIT TRAINING THERAPY: CPT | Mod: CQ

## 2025-07-29 PROCEDURE — 94799 UNLISTED PULMONARY SVC/PX: CPT

## 2025-07-29 PROCEDURE — 85018 HEMOGLOBIN: CPT | Performed by: STUDENT IN AN ORGANIZED HEALTH CARE EDUCATION/TRAINING PROGRAM

## 2025-07-29 PROCEDURE — 11000001 HC ACUTE MED/SURG PRIVATE ROOM

## 2025-07-29 PROCEDURE — 97535 SELF CARE MNGMENT TRAINING: CPT | Mod: CO

## 2025-07-29 RX ADMIN — OXYCODONE HYDROCHLORIDE 10 MG: 10 TABLET ORAL at 09:07

## 2025-07-29 RX ADMIN — MORPHINE SULFATE 4 MG: 4 INJECTION, SOLUTION INTRAMUSCULAR; INTRAVENOUS at 06:07

## 2025-07-29 RX ADMIN — MORPHINE SULFATE 4 MG: 4 INJECTION, SOLUTION INTRAMUSCULAR; INTRAVENOUS at 11:07

## 2025-07-29 RX ADMIN — LOSARTAN POTASSIUM 50 MG: 50 TABLET, FILM COATED ORAL at 09:07

## 2025-07-29 RX ADMIN — SENNOSIDES AND DOCUSATE SODIUM 1 TABLET: 50; 8.6 TABLET ORAL at 09:07

## 2025-07-29 RX ADMIN — OXYCODONE HYDROCHLORIDE 10 MG: 10 TABLET ORAL at 02:07

## 2025-07-29 RX ADMIN — PRAVASTATIN SODIUM 20 MG: 10 TABLET ORAL at 09:07

## 2025-07-29 RX ADMIN — SODIUM CHLORIDE 125 MG: 9 INJECTION, SOLUTION INTRAVENOUS at 10:07

## 2025-07-29 RX ADMIN — ENOXAPARIN SODIUM 40 MG: 40 INJECTION SUBCUTANEOUS at 06:07

## 2025-07-29 NOTE — PROGRESS NOTES
[facility] - 8th Floor [facility] MEDICINE ~ PROGRESS NOTE    CHIEF COMPLAINT   Hospital follow up    HOSPITAL COURSE   69-year-old male with a past medical history of hypertension, tobacco dependence who was walking on wet floor when he slipped and fell.  Denies syncope, head injury, loss of sphincter control, tongue biting.  He was unable to get up after therefore was brought to the emergency room where he was found to have a left intertrochanteric femur fracture.     At baseline the patient lives with his wife and is independent.    Today  Seen with his wife at bedside.  Patient lives home alone with his wife with no one else to help at home and his wife is slightly frail.  Wife would like the patient to be independent and be able to walk from down a hallway, while he is making good progress I do not know that we can meet those expectations at this time.  Therapy continues to work with the patient, family will likely need education regarding reasonable expectations in this postop period   OBJECTIVE/PHYSICAL EXAM     VITAL SIGNS (MOST RECENT):  Temp: 98.9 °F (37.2 °C) (07/29/25 1120)  Pulse: 77 (07/29/25 1120)  Resp: 18 (07/29/25 1137)  BP: 129/62 (07/29/25 1120)  SpO2: 97 % (07/29/25 1120) VITAL SIGNS (24 HOUR RANGE):  Temp:  [97.6 °F (36.4 °C)-99 °F (37.2 °C)] 98.9 °F (37.2 °C)  Pulse:  [69-83] 77  Resp:  [18-20] 18  SpO2:  [97 %-98 %] 97 %  BP: (127-139)/(58-80) 129/62   GENERAL: In no acute distress, afebrile  HEENT:  PERRLA  CHEST: Clear to auscultation bilaterally  HEART: S1, S2, no appreciable murmur  ABDOMEN: Soft, nontender, BS +  MSK:  Bandaging along left hip  NEUROLOGIC: Alert and oriented x4, moving all extremities with good strength     ASSESSMENT/PLAN   Intertrochanteric proximal femur fracture   ABLA  -status post left intertrochanteric femur fracture with IM nail on 07/25/2025 with Dr. Braden Herring  -weight-bearing as tolerated per Orthopedic surgery  -pain control, bowel  regimen as needed   -PT/OT  -Ferrlecit x3, completed 07/29/2025     Primary hypertension   + orthostatics-improving  -tolerating home losartan    Tobacco dependence   -1 pack per day, p.r.n. nicotine patch     DVT prophylaxis:  Lovenox  Anticipated discharge and disposition:  Discharge to home on Wednesday/Thursday likely  __________________________________________________________________________    NUTRITIONAL STATUS     Patient meets ASPEN criteria for   malnutrition of   per RD assessment as evidenced by:                       A minimum of two characteristics is recommended for diagnosis of either severe or non-severe malnutrition.     LABS/MICRO/MEDS/DIAGNOSTICS       LABS  Recent Labs     07/26/25  1513      K 4.7   CO2 28   BUN 17.8   CREATININE 0.84   CALCIUM 8.4*   ALKPHOS 65   AST 22   ALT 15   ALBUMIN 3.3*     Recent Labs     07/28/25  0436 07/29/25  0548   WBC 10.63  --    RBC 3.06*  --    HCT 28.2* 28.0*   MCV 92.2  --      --        MICROBIOLOGY  Microbiology Results (last 7 days)       ** No results found for the last 168 hours. **               MEDICATIONS   enoxparin  40 mg Subcutaneous Daily    losartan  50 mg Oral Daily    pravastatin  20 mg Oral Daily    senna-docusate  1 tablet Oral BID         INFUSIONS         DIAGNOSTIC TESTS  X-Ray Elbow Complete Left   Final Result      X-Ray Femur 2 View Left   Final Result      As above.         Electronically signed by: Tc Enriquez   Date:    07/25/2025   Time:    13:56      SURG FL Surgery Fluoro Usage   Final Result      X-Ray Chest 1 View   Final Result      No acute cardiopulmonary process identified.         Electronically signed by: Irving Lilly   Date:    07/25/2025   Time:    09:14      X-Ray Hip 2 or 3 views Left with Pelvis when performed   Final Result           No echocardiogram results found for the past 14 days.         Case related differential diagnoses have been reviewed; assessment and plan has been documented. I have  personally reviewed the labs and test results that are currently available; I have reviewed the patients medication list. I have reviewed the consulting providers recommendations. I have reviewed or attempted to review medical records based upon their availability.  All of the patient's and/or family's questions have been addressed and answered to the best of my ability.  I will continue to monitor closely and make adjustments to medical management as needed.  This document was created using [employer] Fluency Direct.  Transcription errors may have been made.  Please contact me if any questions may rise regarding documentation to clarify transcription.        Thairy G Reyes, DO   Internal Medicine  Department of Hospital Medicine  [facility] - 8th Floor Med Surg

## 2025-07-29 NOTE — PT/OT/SLP PROGRESS
Occupational Therapy   Treatment    Name: Panfilo Tolentino  MRN: [id number]    Recommendations:     Recommended therapy intensity at discharge: Low Intensity Therapy   Discharge Equipment Recommendations:  wheelchair  Barriers to discharge:       Assessment:     Panfilo Tolentino is a 69 y.o. male with a medical diagnosis of L intertrochanteric femur fracture s/p IM nail (7/25/25) related to slip and fall at home.  He presents with good activity tolerance and motivation to participate. Performance deficits affecting function are weakness, impaired endurance, impaired self care skills, impaired functional mobility, gait instability, impaired balance, decreased safety awareness, pain, impaired cardiopulmonary response to activity, orthopedic precautions. Pt progressing well towards goals. Continuing to recommend low intensity therapy at d/c.     Rehab Prognosis:  Good; patient would benefit from acute skilled OT services to address these deficits and reach maximum level of function.       Plan:     Patient to be seen 6 x/week to address the above listed problems via self-care/home management, therapeutic activities, therapeutic exercises  Plan of Care Expires: 08/23/25  Plan of Care Reviewed with: patient    Subjective     Pain/Comfort:  Location - Side 1: Left  Location 1: leg  Pain Addressed 1: Reposition, Distraction    Objective:     Communicated with: RN prior to session.  Patient found HOB elevated with no active line(s) upon OT entry to room.    General Precautions: Standard, fall    Orthopedic Precautions:LLE weight bearing as tolerated (full ROM)  Braces: N/A  Respiratory Status: Room air  Vital Signs: Blood Pressure: 129/74     Occupational Performance:     Functional Mobility/Transfers:  Bed mobility:    Scooting: stand by assistance  Supine to Sit: minimum assistance and to manage LLE  Transfers: Sit to Stand: stand by assistance with rolling walker  Bed to Chair: contact guard assistance with  rolling walker using Step Transfer  Toilet Transfer: contact guard assistance with rolling walker using Step Transfer  Pt ambulated to bathroom with CGA-SBA and RW. No LOB.     Activities of Daily Living:  Lower Body Dressing: minimum assistance to don/doff socks using sock aid and reacher. Education provided on technique and use of equipment at home.    Toileting: stand by assistance seated posterior pericare after +BM on toilet.     Balance:   Static Sitting Balance: No UE support: Normal: Patient able to maintain steady balance without handhold support.  Dynamic Sitting Balance: No UE support: Normal: Patient accepts maximal challenge and can shift weight easily within full range in all directions.    Therapeutic Positioning    OT interventions performed during the course of today's session in an effort to prevent and/or reduce acquired pressure injuries:   Education was provided on benefits of and recommendations for therapeutic positioning    [facility] 6 Click ADL: 22    Co-Treatment: No    Patient Education:  Patient provided with verbal education education regarding OT role/goals/POC, post op precautions, fall prevention, safety awareness, and Discharge/DME recommendations.  Understanding was verbalized.      Patient left up in chair with all lines intact and call button in reach.    GOALS:   Multidisciplinary Problems       Occupational Therapy Goals          Problem: Occupational Therapy    Goal Priority Disciplines Outcome Interventions   Occupational Therapy Goal     OT, PT/OT Progressing    Description: LTG: Pt will perform basic ADLs and ADL transfers with Modified independence using LRAD by discharge.    STG: to be met by 8/9    Pt will complete grooming standing at sink with LRAD with SBA.  Pt will complete UB dressing with SBA.  Pt will complete LB dressing with SBA using LRAD.  Pt will complete toileting with SBA using LRAD.  Pt will complete functional mobility to/from toilet and toilet transfer with  SBA using LRAD.                         Time Tracking:     OT Date of Treatment: 07/29/25  OT Start Time: 0816  OT Stop Time: 0854  OT Total Time (min): 38 min    Billable Minutes:Self Care/Home Management 38    OTR/L readily available for conference at the time of the provision of services: Alicia Lejeune, LOTR  OT/DALLIN: DALLIN     Number of DALLIN visits since last OT visit: 2    7/29/2025

## 2025-07-29 NOTE — PLAN OF CARE
Problem: Hospitalized Older Adult  Goal: Optimal Cognitive Function  Outcome: Progressing  Goal: Effective Bowel Elimination  Outcome: Progressing  Goal: Optimal Coping  Outcome: Progressing  Goal: Fluid and Electrolyte Balance  Outcome: Progressing  Goal: Optimal Functional Ability  Outcome: Progressing  Goal: Improved Oral Intake  Outcome: Progressing  Goal: Adequate Sleep/Rest  Outcome: Progressing  Goal: Effective Urinary Elimination  Outcome: Progressing

## 2025-07-29 NOTE — PT/OT/SLP PROGRESS
Physical Therapy Treatment    Patient Name:  Panfilo Tolentino   MRN:  [id number]    Recommendations:     Discharge therapy intensity: Low Intensity Therapy (pending prpgress with stair training)   Discharge Equipment Recommendations: wheelchair  Barriers to discharge:     Assessment:     Panfilo Tolentino is a 69 y.o. male admitted with a medical diagnosis of L intertrochanteric femur fracture s/p IM nail (7/25/25) related to slip and fall at home. .  He presents with the following impairments/functional limitations: weakness, impaired endurance, impaired functional mobility, gait instability, impaired balance, decreased lower extremity function, pain, edema, impaired cardiopulmonary response to activity .    Pt able to amb 50ft/30ft with RW SBA. Pt with better knee flexion today in comparison to yesterday.   Administered leg  to assist pt with bed mobility. Pt able to demo sit>supine SBA with use of leg . Limited by pain.     Pt able to complete 2 steps BL HHA min-modA. Pt limited by pain. Planning to attempts steps again tomorrow morning to see if pt requires less assist.   Spoke with CM after tx session to see if pt/family could get rail or ramp for steps.     DME Justification:  Panfilo Tolentino has a mobility limitation that significantly impairs his ability to participate in one or more mobility related activities of daily living (MRADLs) such as toileting, feeding, dressing, grooming, and bathing in customary locations in the home.  The mobility limitation cannot be sufficiently resolved by the use of a cane or walker.   The use of a manual wheelchair will significantly improve the patients ability to participate in MRADLS and the patient will use it on regular basis in the home.  Panfilo Tolentino has expressed his willingness to use a manual wheelchair in the home. Patients upper body strength is sufficient for propulsion.  He also has a caregiver who is available, willing, and able to  provide assistance with the wheelchair when needed.      Rehab Prognosis: Good; patient would benefit from acute skilled PT services to address these deficits and reach maximum level of function.    Recent Surgery: Procedure(s) (LRB):  INSERTION, INTRAMEDULLARY IMPLANT, FEMUR (Left) 4 Days Post-Op    Plan:     During this hospitalization, patient would benefit from acute PT services 6 x/week to address the identified rehab impairments via gait training, therapeutic activities, therapeutic exercises and progress toward the following goals:    Plan of Care Expires:  08/16/25    Subjective     Chief Complaint:   Patient/Family Comments/goals:   Pain/Comfort:  Location - Side 1: Left  Location 1: leg  Pain Addressed 1: Reposition, Distraction      Objective:     Communicated with NSG prior to session.  Patient found up in chair with peripheral IV upon PT entry to room.     General Precautions: Standard, fall  Orthopedic Precautions: LLE weight bearing as tolerated (FROM)  Braces: N/A  Respiratory Status: Room air    Blood Pressure:    Skin Integrity: Visible skin intact      Functional Mobility:  Bed Mobility:     Scooting: stand by assistance  Sit to Supine: stand by assistance and required increased time. Use of leg  for LLE  Transfers:     Sit to Stand:  stand by assistance with rolling walker and 4 trials from bedside chair.    Gait: pt amb 50ft/30ft with RW SBA. Pt with step-to gait pattern. Better L knee flexion today. Seated rest between trials.   Stairs:  Pt ascended/descended 2 stair(s) with No Assistive Device with HHA with Minimal Assistance and Moderate Assistance.       Co-Treatment: No    Education:  Patient and family were provided with verbal education education regarding PT role/goals/POC, safety awareness, and discharge/DME recommendations.  Additional teaching is warranted.     Patient left HOB elevated with call button in reach      GOALS:   Multidisciplinary Problems       Physical Therapy  Goals          Problem: Physical Therapy    Goal Priority Disciplines Outcome Interventions   Physical Therapy Goal     PT, PT/OT Progressing    Description: Goals to be met by: 2025     Patient will increase functional independence with mobility by performin. Supine to sit with Modified [place]  2. Sit to stand transfer with Modified [place]  3. Bed to chair transfer with Modified [place] using Rolling Walker  4. Gait  x 150 feet with Modified [place] using Rolling Walker.   5. Stand for 10 minutes with Modified [place] using Rolling Walker  6. Pt able to navigate 3 steps without handrails with CGA.                         Time Tracking:     PT Received On: 25  PT Start Time: 926     PT Stop Time: 1004  PT Total Time (min): 38 min     Billable Minutes: Gait Training 30 and Therapeutic Activity 8    Treatment Type: Treatment  PT/PTA: PTA     Number of PTA visits since last PT visit: 2     2025

## 2025-07-29 NOTE — PLAN OF CARE
Therapy said pt is not safe at this time to go home without a handrail. Spoke to pt in room with wife on speaker phone about getting a handrail installed at the entrance to their home. Pt and wife said they know someone who could make him one and wife will go today to speak to them. At this point therapy is recommending low intensity. Will wait for therapy to see pt tomorrow to reevaluate before doing any dc referrals.

## 2025-07-30 LAB
HCT VFR BLD AUTO: 31.9 % (ref 42–52)
HGB BLD-MCNC: 10.5 G/DL (ref 14–18)

## 2025-07-30 PROCEDURE — 25000003 PHARM REV CODE 250: Performed by: STUDENT IN AN ORGANIZED HEALTH CARE EDUCATION/TRAINING PROGRAM

## 2025-07-30 PROCEDURE — 11000001 HC ACUTE MED/SURG PRIVATE ROOM

## 2025-07-30 PROCEDURE — 85018 HEMOGLOBIN: CPT | Performed by: STUDENT IN AN ORGANIZED HEALTH CARE EDUCATION/TRAINING PROGRAM

## 2025-07-30 PROCEDURE — 36415 COLL VENOUS BLD VENIPUNCTURE: CPT | Performed by: STUDENT IN AN ORGANIZED HEALTH CARE EDUCATION/TRAINING PROGRAM

## 2025-07-30 PROCEDURE — 97116 GAIT TRAINING THERAPY: CPT | Mod: CQ

## 2025-07-30 PROCEDURE — 97535 SELF CARE MNGMENT TRAINING: CPT | Mod: CQ

## 2025-07-30 PROCEDURE — 97530 THERAPEUTIC ACTIVITIES: CPT | Mod: CQ

## 2025-07-30 PROCEDURE — 97535 SELF CARE MNGMENT TRAINING: CPT | Mod: CO

## 2025-07-30 PROCEDURE — 63600175 PHARM REV CODE 636 W HCPCS: Performed by: STUDENT IN AN ORGANIZED HEALTH CARE EDUCATION/TRAINING PROGRAM

## 2025-07-30 RX ORDER — HYDROCODONE BITARTRATE AND ACETAMINOPHEN 5; 325 MG/1; MG/1
1 TABLET ORAL EVERY 6 HOURS PRN
Qty: 28 TABLET | Refills: 0 | Status: ON HOLD | OUTPATIENT
Start: 2025-07-30 | End: 2025-08-06

## 2025-07-30 RX ORDER — MORPHINE SULFATE 4 MG/ML
2 INJECTION, SOLUTION INTRAMUSCULAR; INTRAVENOUS EVERY 6 HOURS PRN
Status: DISCONTINUED | OUTPATIENT
Start: 2025-07-30 | End: 2025-07-31 | Stop reason: HOSPADM

## 2025-07-30 RX ORDER — TIZANIDINE 2 MG/1
2 TABLET ORAL EVERY 8 HOURS PRN
Status: DISCONTINUED | OUTPATIENT
Start: 2025-07-30 | End: 2025-07-31 | Stop reason: HOSPADM

## 2025-07-30 RX ORDER — AMOXICILLIN 250 MG
1 CAPSULE ORAL 2 TIMES DAILY
Qty: 60 TABLET | Refills: 0 | Status: ON HOLD | OUTPATIENT
Start: 2025-07-30 | End: 2025-08-29

## 2025-07-30 RX ADMIN — MORPHINE SULFATE 4 MG: 4 INJECTION, SOLUTION INTRAMUSCULAR; INTRAVENOUS at 10:07

## 2025-07-30 RX ADMIN — OXYCODONE HYDROCHLORIDE 10 MG: 10 TABLET ORAL at 01:07

## 2025-07-30 RX ADMIN — SENNOSIDES AND DOCUSATE SODIUM 1 TABLET: 50; 8.6 TABLET ORAL at 08:07

## 2025-07-30 RX ADMIN — PRAVASTATIN SODIUM 20 MG: 10 TABLET ORAL at 08:07

## 2025-07-30 RX ADMIN — OXYCODONE HYDROCHLORIDE 5 MG: 5 TABLET ORAL at 06:07

## 2025-07-30 RX ADMIN — LOSARTAN POTASSIUM 50 MG: 50 TABLET, FILM COATED ORAL at 08:07

## 2025-07-30 RX ADMIN — MORPHINE SULFATE 2 MG: 4 INJECTION, SOLUTION INTRAMUSCULAR; INTRAVENOUS at 10:07

## 2025-07-30 RX ADMIN — SENNOSIDES AND DOCUSATE SODIUM 1 TABLET: 50; 8.6 TABLET ORAL at 10:07

## 2025-07-30 RX ADMIN — ENOXAPARIN SODIUM 40 MG: 40 INJECTION SUBCUTANEOUS at 04:07

## 2025-07-30 RX ADMIN — MORPHINE SULFATE 2 MG: 4 INJECTION, SOLUTION INTRAMUSCULAR; INTRAVENOUS at 04:07

## 2025-07-30 RX ADMIN — OXYCODONE HYDROCHLORIDE 5 MG: 5 TABLET ORAL at 11:07

## 2025-07-30 RX ADMIN — OXYCODONE HYDROCHLORIDE 10 MG: 10 TABLET ORAL at 07:07

## 2025-07-30 NOTE — PLAN OF CARE
Per Carol at [facility], they are able to accept pt tomorrow if medically ready. Secure chat sent to MD. Pt notified.

## 2025-07-30 NOTE — PT/OT/SLP PROGRESS
Occupational Therapy   Treatment    Name: Panfilo Tolentino  MRN: [id number]    Recommendations:     Recommended therapy intensity at discharge: Moderate Intensity Therapy (pending progress with stairs)   Discharge Equipment Recommendations:  wheelchair  Barriers to discharge:       Assessment:     Panfilo Tolentino is a 69 y.o. male with a medical diagnosis of L intertrochanteric femur fracture s/p IM nail (7/25/25) related to slip and fall at home. Performance deficits affecting function are weakness, impaired endurance, impaired self care skills, impaired functional mobility, gait instability, impaired balance, decreased safety awareness, pain, impaired cardiopulmonary response to activity, orthopedic precautions. Pt tolerated session well.     Rehab Prognosis:  Good; patient would benefit from acute skilled OT services to address these deficits and reach maximum level of function.       Plan:     Patient to be seen 6 x/week to address the above listed problems via self-care/home management, therapeutic activities, therapeutic exercises  Plan of Care Expires: 08/23/25  Plan of Care Reviewed with: patient    Subjective     Pain/Comfort:  Location - Side 1: Left  Location 1: leg  Pain Addressed 1: Reposition, Distraction    Objective:     Communicated with: RN prior to session.  Patient found HOB elevated with no active line(s) upon OT entry to room.    General Precautions: Standard, fall    Orthopedic Precautions:LLE weight bearing as tolerated (full ROM)  Braces: N/A  Respiratory Status: Room air     Occupational Performance:     Functional Mobility/Transfers:  Bed mobility:    Scooting: stand by assistance  Supine to Sit: minimum assistance and to manage LLE  Sit to Supine: minimum assistance and to manage LLE  Transfers: Sit to Stand: contact guard assistance with rolling walker  Toilet Transfer: minimum assistance with rolling walker using Step Transfer. BSC removed from top and pt required increased  assist to stand from low surface.   Pt ambulated to bathroom with SBA and RW. No LOB.     Activities of Daily Living:  Lower Body Dressing: minimum assistance to don slippers.   Toileting: stand by assistance for seated pericare after +BM.     Balance:   Static Sitting Balance: No UE support: Normal: Patient able to maintain steady balance without handhold support.  Dynamic Sitting Balance: No UE support: Normal: Patient accepts maximal challenge and can shift weight easily within full range in all directions.    Therapeutic Positioning    OT interventions performed during the course of today's session in an effort to prevent and/or reduce acquired pressure injuries:   Education was provided on benefits of and recommendations for therapeutic positioning    [facility] 6 Click ADL: 22    Co-Treatment: No    Patient Education:  Patient provided with verbal education education regarding OT role/goals/POC, post op precautions, fall prevention, and safety awareness.  Understanding was verbalized.      Patient left HOB elevated with all lines intact and call button in reach.    GOALS:   Multidisciplinary Problems       Occupational Therapy Goals          Problem: Occupational Therapy    Goal Priority Disciplines Outcome Interventions   Occupational Therapy Goal     OT, PT/OT Progressing    Description: LTG: Pt will perform basic ADLs and ADL transfers with Modified independence using LRAD by discharge.    STG: to be met by 8/9    Pt will complete grooming standing at sink with LRAD with SBA.  Pt will complete UB dressing with SBA.  Pt will complete LB dressing with SBA using LRAD.  Pt will complete toileting with SBA using LRAD.  Pt will complete functional mobility to/from toilet and toilet transfer with SBA using LRAD.                         Time Tracking:     OT Date of Treatment: 07/30/25  OT Start Time: 1410  OT Stop Time: 1433  OT Total Time (min): 23 min    Billable Minutes:Self Care/Home Management 23    OTR/L readily  available for conference at the time of the provision of services: Alicia Lejeune, LOTR  OT/DALLIN: DALLIN     Number of DALLIN visits since last OT visit: 3    7/30/2025

## 2025-07-30 NOTE — PLAN OF CARE
Problem: Hospitalized Older Adult  Goal: Optimal Cognitive Function  Outcome: Progressing  Goal: Effective Bowel Elimination  Outcome: Progressing  Goal: Optimal Coping  Outcome: Progressing  Goal: Fluid and Electrolyte Balance  Outcome: Progressing  Goal: Optimal Functional Ability  Outcome: Progressing  Goal: Improved Oral Intake  Outcome: Progressing  Goal: Adequate Sleep/Rest  Outcome: Progressing  Goal: Effective Urinary Elimination  Outcome: Progressing     Problem: Adult Inpatient Plan of Care  Goal: Plan of Care Review  Outcome: Progressing  Goal: Patient-Specific Goal (Individualized)  Outcome: Progressing  Goal: Absence of Hospital-Acquired Illness or Injury  Outcome: Progressing  Goal: Optimal Comfort and Wellbeing  Outcome: Progressing  Goal: Readiness for Transition of Care  Outcome: Progressing     Problem: Wound  Goal: Optimal Coping  Outcome: Progressing  Goal: Optimal Functional Ability  Outcome: Progressing  Goal: Absence of Infection Signs and Symptoms  Outcome: Progressing  Goal: Improved Oral Intake  Outcome: Progressing  Goal: Optimal Pain Control and Function  Outcome: Progressing  Goal: Skin Health and Integrity  Outcome: Progressing  Goal: Optimal Wound Healing  Outcome: Progressing     Problem: Fall Injury Risk  Goal: Absence of Fall and Fall-Related Injury  Outcome: Progressing

## 2025-07-30 NOTE — PLAN OF CARE
07/30/25 1055   Discharge Reassessment   Assessment Type Discharge Planning Reassessment   Did the patient's condition or plan change since previous assessment? Yes   Discharge Plan discussed with: Patient;Spouse/sig other   Name(s) and Number(s) Dayanna Tolentino [phone]   Communicated GEOVANNI with patient/caregiver Date not available/Unable to determine   Discharge Plan A Skilled Nursing Facility   Discharge Plan B Skilled Nursing Facility   DME Needed Upon Discharge  none   Transition of Care Barriers None   Why the patient remains in the hospital Requires continued medical care   Post-Acute Status   Post-Acute Authorization Placement   Post-Acute Placement Status Patient List Provided   Patient choice form signed by patient/caregiver List with quality metrics by geographic area provided   Discharge Delays None known at this time     DC planning discussed with pt in his room. Therapy is now recommending SNF. Patient choice list presented to pt. He asked me to contact his wife, Dayanna, and said she can choose. Pt said he is fine with whatever his wife chooses. Called and left message for Dayanna with no pt identifiers mentioned.     11:05am-received a return call from pt's spouse. Gave her SNF choices.  FOC signed by pt. Referral sent to TCU via [employer].

## 2025-07-30 NOTE — PROGRESS NOTES
[facility] - 8th [facility] MEDICINE ~ PROGRESS NOTE    CHIEF COMPLAINT   Hospital follow up    HOSPITAL COURSE   69-year-old male with a past medical history of hypertension, tobacco dependence who was walking on wet floor when he slipped and fell.  Denies syncope, head injury, loss of sphincter control, tongue biting.  He was unable to get up after therefore was brought to the emergency room where he was found to have a left intertrochanteric femur fracture.     At baseline the patient lives with his wife and is independent.    Patient lives home alone with his wife with no one else to help at home.  He has steps in his unable to complete them easily with physical therapy as of 07/30/2025.  At this point needs referral to SNF    Today  Seen with his wife at bedside.  Patient has been having some orthostatic hypotension, repeat H&H is stable.  Encouraged nursing staff and Physical therapy to use Darrell hose  OBJECTIVE/PHYSICAL EXAM     VITAL SIGNS (MOST RECENT):  Temp: 98.1 °F (36.7 °C) (07/30/25 1139)  Pulse: 74 (07/30/25 1139)  Resp: 18 (07/30/25 1350)  BP: 128/67 (07/30/25 1139)  SpO2: 97 % (07/30/25 1139) VITAL SIGNS (24 HOUR RANGE):  Temp:  [97.7 °F (36.5 °C)-98.5 °F (36.9 °C)] 98.1 °F (36.7 °C)  Pulse:  [66-85] 74  Resp:  [18] 18  SpO2:  [92 %-99 %] 97 %  BP: ()/(54-73) 128/67   GENERAL: In no acute distress, afebrile  HEENT:  PERRLA  CHEST: Clear to auscultation bilaterally  HEART: S1, S2, no appreciable murmur  ABDOMEN: Soft, nontender, BS +  MSK:  Bandaging along left hip  NEUROLOGIC: Alert and oriented x4, moving all extremities with good strength     ASSESSMENT/PLAN   Intertrochanteric proximal femur fracture   ABLA  -status post left intertrochanteric femur fracture with IM nail on 07/25/2025 with Dr. Braden Herring  -weight-bearing as tolerated per Orthopedic surgery  -pain control, bowel regimen as needed   -PT/OT  -Ferrlecit x3, completed 07/29/2025     Primary  "hypertension   + orthostatics-improving  -continues to have orthostatic hypotension, discontinue losartan on 07/30/2025  -encourage Darrell sanchez    Tobacco dependence   -1 pack per day, p.r.n. nicotine patch     DVT prophylaxis:  Lovenox  Anticipated discharge and disposition:  Discharge to SNF. Referrals started 7/30  __________________________________________________________________________    NUTRITIONAL STATUS     Patient meets ASPEN criteria for   malnutrition of   per RD assessment as evidenced by:                       A minimum of two characteristics is recommended for diagnosis of either severe or non-severe malnutrition.     LABS/MICRO/MEDS/DIAGNOSTICS       LABS  No results for input(s): "NA", "K", "CHLORIDE", "CO2", "BUN", "CREATININE", "GLUCOSE", "CALCIUM", "ALKPHOS", "AST", "ALT", "ALBUMIN" in the last 72 hours.    Recent Labs     07/28/25  0436 07/29/25  0548 07/30/25  1015   WBC 10.63  --   --    RBC 3.06*  --   --    HCT 28.2*   < > 31.9*   MCV 92.2  --   --      --   --     < > = values in this interval not displayed.       MICROBIOLOGY  Microbiology Results (last 7 days)       ** No results found for the last 168 hours. **               MEDICATIONS   enoxparin  40 mg Subcutaneous Daily    losartan  50 mg Oral Daily    pravastatin  20 mg Oral Daily    senna-docusate  1 tablet Oral BID         INFUSIONS         DIAGNOSTIC TESTS  X-Ray Elbow Complete Left   Final Result      X-Ray Femur 2 View Left   Final Result      As above.         Electronically signed by: Tc Enriquez   Date:    07/25/2025   Time:    13:56      SURG FL Surgery Fluoro Usage   Final Result      X-Ray Chest 1 View   Final Result      No acute cardiopulmonary process identified.         Electronically signed by: Irving Lilly   Date:    07/25/2025   Time:    09:14      X-Ray Hip 2 or 3 views Left with Pelvis when performed   Final Result           No echocardiogram results found for the past 14 days.         Case related " differential diagnoses have been reviewed; assessment and plan has been documented. I have personally reviewed the labs and test results that are currently available; I have reviewed the patients medication list. I have reviewed the consulting providers recommendations. I have reviewed or attempted to review medical records based upon their availability.  All of the patient's and/or family's questions have been addressed and answered to the best of my ability.  I will continue to monitor closely and make adjustments to medical management as needed.  This document was created using M*Modal Fluency Direct.  Transcription errors may have been made.  Please contact me if any questions may rise regarding documentation to clarify transcription.        Thairy G Reyes, DO   Internal Medicine  Department of Hospital Medicine  [facility] - 8th Floor Med Surg

## 2025-07-30 NOTE — PLAN OF CARE
07/30/25 1116   Medicare Message   Important Message from Medicare regarding Discharge Appeal Rights Given to patient/caregiver;Explained to patient/caregiver

## 2025-07-30 NOTE — PT/OT/SLP PROGRESS
Physical Therapy Treatment    Patient Name:  Panfilo Tolentino   MRN:  [id number]    Recommendations:     Discharge therapy intensity: Moderate Intensity Therapy (pending progress with stairs)   Discharge Equipment Recommendations: wheelchair  Barriers to discharge:     Assessment:     Panfilo Tolentino is a 69 y.o. male admitted with a medical diagnosis of L intertrochanteric femur fracture s/p IM nail (7/25/25) related to slip and fall at home. .  He presents with the following impairments/functional limitations: weakness, impaired endurance, impaired functional mobility, gait instability, impaired balance, decreased lower extremity function, pain, edema, impaired cardiopulmonary response to activity .    Pt able to amb 100ft with RW SBA.     Pt able to complete 2 steps with R handrail min-modA. Pt limited by pain. Planning to attempts steps again tomorrow morning to see if pt requires less assist. Pt only limited by pain and stair training at this time. His family unable to assist him.   CM to start setting up placement for pt in meantime.     DME Justification:  Panfilo Tolentino has a mobility limitation that significantly impairs his ability to participate in one or more mobility related activities of daily living (MRADLs) such as toileting, feeding, dressing, grooming, and bathing in customary locations in the home.  The mobility limitation cannot be sufficiently resolved by the use of a cane or walker.   The use of a manual wheelchair will significantly improve the patients ability to participate in MRADLS and the patient will use it on regular basis in the home.  Panfilo Tolentino has expressed his willingness to use a manual wheelchair in the home. Patients upper body strength is sufficient for propulsion.  He also has a caregiver who is available, willing, and able to provide assistance with the wheelchair when needed.      Rehab Prognosis: Good; patient would benefit from acute skilled PT  services to address these deficits and reach maximum level of function.    Recent Surgery: Procedure(s) (LRB):  INSERTION, INTRAMEDULLARY IMPLANT, FEMUR (Left) 5 Days Post-Op    Plan:     During this hospitalization, patient would benefit from acute PT services 6 x/week to address the identified rehab impairments via gait training, therapeutic activities, therapeutic exercises and progress toward the following goals:    Plan of Care Expires:  08/16/25    Subjective     Chief Complaint:   Patient/Family Comments/goals:   Pain/Comfort:  Location - Side 1: Left  Location 1: leg  Pain Addressed 1: Reposition, Distraction, Nurse notified      Objective:     Communicated with NSG prior to session.  Patient found HOB elevated with peripheral IV upon PT entry to room.     General Precautions: Standard, fall  Orthopedic Precautions: LLE weight bearing as tolerated (FROM)  Braces: N/A  Respiratory Status: Room air    Blood Pressure:    Skin Integrity: Visible skin intact      Functional Mobility:  Bed Mobility:     Scooting: stand by assistance  Supine to Sit: stand by assistance and with use of leg    Transfers:     Sit to Stand:  stand by assistance with rolling walker and 1 trial from EOB, 3 trials from bedside chair, 1 trial from toilet   Toilet Transfer: stand by assistance with  rolling walker  using  Step Transfer  Gait: pt amb 100ft with RW SBA. Pt with short step-through gait pattern. Better L knee flexion today. Seated rest between trials.   Stairs:  Pt ascended/descended 2 stair(s) with No Assistive Device with right handrail with Minimal Assistance and Moderate Assistance. Required increased time.     Heavily discussed d/c planning with pt and family. Also educated pt he needs to mobilize more and not just when therapy is present.       Co-Treatment: No    Education:  Patient and family were provided with verbal education education regarding PT role/goals/POC, safety awareness, and discharge/DME  recommendations.  Additional teaching is warranted.     Patient left up in chair with call button in reach      GOALS:   Multidisciplinary Problems       Physical Therapy Goals          Problem: Physical Therapy    Goal Priority Disciplines Outcome Interventions   Physical Therapy Goal     PT, PT/OT Progressing    Description: Goals to be met by: 2025     Patient will increase functional independence with mobility by performin. Supine to sit with Modified [place]  2. Sit to stand transfer with Modified [place]  3. Bed to chair transfer with Modified [place] using Rolling Walker  4. Gait  x 150 feet with Modified [place] using Rolling Walker.   5. Stand for 10 minutes with Modified [place] using Rolling Walker  6. Pt able to navigate 3 steps without handrails with CGA.                         Time Tracking:     PT Received On: 25  PT Start Time: 0911     PT Stop Time: 1011  PT Total Time (min): 60 min     Billable Minutes: Gait Training 35 and Therapeutic Activity 15EDU 10    Treatment Type: Treatment  PT/PTA: PTA     Number of PTA visits since last PT visit: 3     2025

## 2025-07-30 NOTE — PLAN OF CARE
Problem: Hospitalized Older Adult  Goal: Optimal Cognitive Function  Outcome: Progressing  Goal: Effective Bowel Elimination  Outcome: Progressing  Goal: Optimal Coping  Outcome: Progressing  Goal: Fluid and Electrolyte Balance  Outcome: Progressing  Goal: Optimal Functional Ability  Outcome: Progressing  Goal: Improved Oral Intake  Outcome: Progressing  Goal: Adequate Sleep/Rest  Outcome: Progressing  Goal: Effective Urinary Elimination  Outcome: Progressing     Problem: Adult Inpatient Plan of Care  Goal: Plan of Care Review  Outcome: Progressing  Goal: Patient-Specific Goal (Individualized)  Outcome: Progressing  Goal: Absence of Hospital-Acquired Illness or Injury  Outcome: Progressing  Goal: Optimal Comfort and Wellbeing  Outcome: Progressing  Goal: Readiness for Transition of Care  Outcome: Progressing     Problem: Infection  Goal: Absence of Infection Signs and Symptoms  Outcome: Progressing     Problem: Wound  Goal: Optimal Coping  Outcome: Progressing  Goal: Optimal Functional Ability  Outcome: Progressing  Goal: Absence of Infection Signs and Symptoms  Outcome: Progressing  Goal: Improved Oral Intake  Outcome: Progressing  Goal: Optimal Pain Control and Function  Outcome: Progressing  Goal: Skin Health and Integrity  Outcome: Progressing  Goal: Optimal Wound Healing  Outcome: Progressing     Problem: Fall Injury Risk  Goal: Absence of Fall and Fall-Related Injury  Outcome: Progressing     Problem: Pain Acute  Goal: Optimal Pain Control and Function  Outcome: Progressing

## 2025-07-31 VITALS
TEMPERATURE: 98 F | WEIGHT: 218 LBS | HEIGHT: 74 IN | OXYGEN SATURATION: 99 % | RESPIRATION RATE: 18 BRPM | HEART RATE: 63 BPM | BODY MASS INDEX: 27.98 KG/M2 | DIASTOLIC BLOOD PRESSURE: 69 MMHG | SYSTOLIC BLOOD PRESSURE: 147 MMHG

## 2025-07-31 PROCEDURE — 99900031 HC PATIENT EDUCATION (STAT)

## 2025-07-31 PROCEDURE — 99900035 HC TECH TIME PER 15 MIN (STAT)

## 2025-07-31 PROCEDURE — 25000003 PHARM REV CODE 250: Performed by: STUDENT IN AN ORGANIZED HEALTH CARE EDUCATION/TRAINING PROGRAM

## 2025-07-31 PROCEDURE — 63600175 PHARM REV CODE 636 W HCPCS: Performed by: STUDENT IN AN ORGANIZED HEALTH CARE EDUCATION/TRAINING PROGRAM

## 2025-07-31 PROCEDURE — 94799 UNLISTED PULMONARY SVC/PX: CPT

## 2025-07-31 RX ORDER — IBUPROFEN 200 MG
16 TABLET ORAL
OUTPATIENT
Start: 2025-07-31

## 2025-07-31 RX ORDER — TIZANIDINE 2 MG/1
2 TABLET ORAL EVERY 8 HOURS PRN
OUTPATIENT
Start: 2025-07-31

## 2025-07-31 RX ORDER — IBUPROFEN 200 MG
24 TABLET ORAL
OUTPATIENT
Start: 2025-07-31

## 2025-07-31 RX ORDER — TALC
9 POWDER (GRAM) TOPICAL NIGHTLY PRN
OUTPATIENT
Start: 2025-07-31

## 2025-07-31 RX ORDER — IPRATROPIUM BROMIDE AND ALBUTEROL SULFATE 2.5; .5 MG/3ML; MG/3ML
3 SOLUTION RESPIRATORY (INHALATION) EVERY 6 HOURS PRN
OUTPATIENT
Start: 2025-07-31

## 2025-07-31 RX ORDER — ACETAMINOPHEN 325 MG/1
650 TABLET ORAL EVERY 4 HOURS PRN
OUTPATIENT
Start: 2025-07-31

## 2025-07-31 RX ORDER — SODIUM CHLORIDE 0.9 % (FLUSH) 0.9 %
10 SYRINGE (ML) INJECTION
OUTPATIENT
Start: 2025-07-31

## 2025-07-31 RX ORDER — ALUMINUM HYDROXIDE, MAGNESIUM HYDROXIDE, AND SIMETHICONE 1200; 120; 1200 MG/30ML; MG/30ML; MG/30ML
30 SUSPENSION ORAL 4 TIMES DAILY PRN
OUTPATIENT
Start: 2025-07-31

## 2025-07-31 RX ORDER — ONDANSETRON HYDROCHLORIDE 2 MG/ML
4 INJECTION, SOLUTION INTRAVENOUS EVERY 8 HOURS PRN
OUTPATIENT
Start: 2025-07-31

## 2025-07-31 RX ORDER — SIMETHICONE 80 MG
1 TABLET,CHEWABLE ORAL 4 TIMES DAILY PRN
OUTPATIENT
Start: 2025-07-31

## 2025-07-31 RX ORDER — NALOXONE HCL 0.4 MG/ML
0.02 VIAL (ML) INJECTION
OUTPATIENT
Start: 2025-07-31

## 2025-07-31 RX ORDER — POLYETHYLENE GLYCOL 3350 17 G/17G
17 POWDER, FOR SOLUTION ORAL 3 TIMES DAILY PRN
OUTPATIENT
Start: 2025-07-31

## 2025-07-31 RX ORDER — PRAVASTATIN SODIUM 10 MG/1
20 TABLET ORAL DAILY
OUTPATIENT
Start: 2025-07-31

## 2025-07-31 RX ORDER — OXYCODONE HYDROCHLORIDE 5 MG/1
5 TABLET ORAL EVERY 4 HOURS PRN
Refills: 0 | OUTPATIENT
Start: 2025-07-31

## 2025-07-31 RX ORDER — ONDANSETRON 4 MG/1
8 TABLET, ORALLY DISINTEGRATING ORAL EVERY 8 HOURS PRN
OUTPATIENT
Start: 2025-07-31

## 2025-07-31 RX ORDER — GLUCAGON 1 MG
1 KIT INJECTION
OUTPATIENT
Start: 2025-07-31

## 2025-07-31 RX ORDER — MORPHINE SULFATE 4 MG/ML
2 INJECTION, SOLUTION INTRAMUSCULAR; INTRAVENOUS EVERY 6 HOURS PRN
Refills: 0 | OUTPATIENT
Start: 2025-07-31

## 2025-07-31 RX ORDER — ENOXAPARIN SODIUM 100 MG/ML
40 INJECTION SUBCUTANEOUS EVERY 24 HOURS
OUTPATIENT
Start: 2025-07-31

## 2025-07-31 RX ORDER — BISACODYL 10 MG/1
10 SUPPOSITORY RECTAL DAILY PRN
OUTPATIENT
Start: 2025-07-31

## 2025-07-31 RX ORDER — IBUPROFEN 200 MG
1 TABLET ORAL DAILY PRN
OUTPATIENT
Start: 2025-07-31

## 2025-07-31 RX ORDER — AMOXICILLIN 250 MG
1 CAPSULE ORAL 2 TIMES DAILY
OUTPATIENT
Start: 2025-07-31

## 2025-07-31 RX ADMIN — OXYCODONE HYDROCHLORIDE 5 MG: 5 TABLET ORAL at 09:07

## 2025-07-31 RX ADMIN — SENNOSIDES AND DOCUSATE SODIUM 1 TABLET: 50; 8.6 TABLET ORAL at 09:07

## 2025-07-31 RX ADMIN — MORPHINE SULFATE 2 MG: 4 INJECTION, SOLUTION INTRAMUSCULAR; INTRAVENOUS at 06:07

## 2025-07-31 RX ADMIN — ACETAMINOPHEN 650 MG: 325 TABLET ORAL at 12:07

## 2025-07-31 RX ADMIN — PRAVASTATIN SODIUM 20 MG: 10 TABLET ORAL at 09:07

## 2025-07-31 NOTE — NURSING
Pt discharged to [facility]. Report was given to Tanisha. Iv's removed, catheter intact. Pt discharged in stable condition.

## 2025-07-31 NOTE — PROGRESS NOTES
[facility] - 8th Floor Med Surg  Wound Care    Patient Name:  Panfilo Tolentino   MRN:  [id number]  Date: 7/31/2025  Diagnosis: Intertrochanteric fracture of left femur, closed, initial encounter    History:     Past Medical History:   Diagnosis Date    Hypertension     Mixed hyperlipidemia        Social History[1]    Precautions:     Allergies as of 07/25/2025    (No Known Allergies)       WO Assessment Details/Treatment        07/31/25 0906   WOCN Assessment   Visit Date 07/31/25   Visit Time 0906   Consult Type Follow Up   [facility] Speciality Wound   Intervention applied;chart review   Teaching on-going        Wound 07/25/25 1208 Incision Left lateral Thigh   Date First Assessed/Time First Assessed: 07/25/25 1208   Present on Original Admission: No  Primary Wound Type: Incision  Side: Left  Orientation: lateral  Location: Thigh  Closure Method: Staples  Additional Comments: xeroform, island dressing   Wound Image     Dressing Appearance Dry;Intact;Clean   Drainage Amount None   Drainage Characteristics/Odor No odor   Appearance Staples intact;Dry   Care Cleansed with:;Antimicrobial agent;Other (see comments)  (xeroform, abd, tape)        Wound 07/27/25 Skin Tear Left lower Back   Date First Assessed: 07/27/25   Primary Wound Type: Skin Tear  Side: Left  Orientation: lower  Location: Back   Wound Image    Dressing Appearance Clean;Dry;Intact   Drainage Amount None   Drainage Characteristics/Odor No odor   Appearance Pink;Dry   Tissue loss description Partial thickness   Periwound Area [place]   Care Antimicrobial agent;Cleansed with:;Other (see comments)  (vashe)   Dressing Applied;Other (comment)  (foam)     WO follow up for L hip surgical incisions. Family present at bedside. Continue current treatment recommendations: cleanse incision site with normal saline, dry well, cover incisions with xeroform, cover with abd pad, change daily and PRN soilage. L lower back: cleanse with normal saline, dry  well, cover with foam dressing, change every two days. Wound care will follow up. Pt expected to be discharged today.      07/31/2025         [1]   Social History  Socioeconomic History    Marital status:    Tobacco Use    Smoking status: Every Day     Current packs/day: 1.00     Types: Cigarettes    Smokeless tobacco: Never   Substance and Sexual Activity    Alcohol use: Yes    Drug use: Not Currently     Social Drivers of Health     Financial Resource Strain: Patient Declined (7/27/2025)    Overall Financial Resource Strain (CARDIA)     Difficulty of Paying Living Expenses: Patient declined   Food Insecurity: Patient Declined (7/27/2025)    Hunger Vital Sign     Worried About Running Out of Food in the Last Year: Patient declined     Ran Out of Food in the Last Year: Patient declined   Transportation Needs: Patient Declined (7/27/2025)    PRAPARE - Transportation     Lack of Transportation (Medical): Patient declined     Lack of Transportation (Non-Medical): Patient declined   Stress: Patient Declined (7/27/2025)    [employer] [place] [employer] - Occupational Stress Questionnaire     Feeling of Stress : Patient declined   Housing Stability: Patient Declined (7/27/2025)    Housing Stability Vital Sign     Unable to Pay for Housing in the Last Year: Patient declined     Homeless in the Last Year: Patient declined

## 2025-07-31 NOTE — PLAN OF CARE
07/31/25 1030   Final Note   Assessment Type Final Discharge Note   Anticipated Discharge Disposition SNF   What phone number can be called within the next 1-3 days to see how you are doing after discharge? [phone]   Post-Acute Status   Post-Acute Authorization Placement   Post-Acute Placement Status Set-up Complete/Auth obtained   Discharge Delays None known at this time     Pt being discharged to TCU via facility transport. Pt and family aware. No dc needs at this time.

## 2025-07-31 NOTE — PLAN OF CARE
Problem: Hospitalized Older Adult  Goal: Optimal Cognitive Function  Outcome: Met  Goal: Effective Bowel Elimination  Outcome: Met  Goal: Optimal Coping  Outcome: Met  Goal: Fluid and Electrolyte Balance  Outcome: Met  Goal: Optimal Functional Ability  Outcome: Met  Goal: Improved Oral Intake  Outcome: Met  Goal: Adequate Sleep/Rest  Outcome: Met  Goal: Effective Urinary Elimination  Outcome: Met     Problem: Adult Inpatient Plan of Care  Goal: Plan of Care Review  Outcome: Met  Goal: Patient-Specific Goal (Individualized)  Outcome: Met  Goal: Absence of Hospital-Acquired Illness or Injury  Outcome: Met  Goal: Optimal Comfort and Wellbeing  Outcome: Met  Goal: Readiness for Transition of Care  Outcome: Met     Problem: Infection  Goal: Absence of Infection Signs and Symptoms  Outcome: Met     Problem: Wound  Goal: Optimal Coping  Outcome: Met  Goal: Optimal Functional Ability  Outcome: Met  Goal: Absence of Infection Signs and Symptoms  Outcome: Met  Goal: Improved Oral Intake  Outcome: Met  Goal: Optimal Pain Control and Function  Outcome: Met  Goal: Skin Health and Integrity  Outcome: Met  Goal: Optimal Wound Healing  Outcome: Met     Problem: Fall Injury Risk  Goal: Absence of Fall and Fall-Related Injury  Outcome: Met     Problem: Pain Acute  Goal: Optimal Pain Control and Function  Outcome: Met

## 2025-07-31 NOTE — DISCHARGE SUMMARY
"[facility] Medicine Discharge Summary    Admit Date: 7/25/2025  Discharge Date and Time: 7/31/20256:22 AM  Admitting Physician: Hospitalist team   Discharging Physician: Gold Mac MD.  Primary Care Physician: Pierre Ledesma MD      Discharge Diagnoses:  Intertrochanteric proximal femur fracture  Acute blood loss anemia  Iron deficiency anemia  Orthostatic hypotension  Tobacco abuse  H/o: HTN    Hospital Course:   69-year-old male with a past medical history of hypertension, tobacco dependence who was walking on wet floor when he slipped and fell.  Denies syncope, head injury, loss of sphincter control, tongue biting.  He was unable to get up after therefore was brought to the emergency room where he was found to have a left intertrochanteric femur fracture.     At baseline the patient lives with his wife and is independent.     Patient lives home alone with his wife with no one else to help at home.  He has steps in his unable to complete them easily with physical therapy as of 07/30/2025.  At this point needs referral to SNF.  He was accepted to TCU on 7/31.  Will continue SANJAY for orthostatic hypotension but improved after discontinuing Losartan.  Continue with oral iron replacement, completed 3 days of IV Ferrlicit.  Pain is controlled.  WBAT per ortho.  ASA 81mg BID upon discharge..     Vitals:  Blood pressure (!) 143/75, pulse 65, temperature 98 °F (36.7 °C), temperature source Oral, resp. rate 18, height 6' 2" (1.88 m), weight 98.9 kg (218 lb), SpO2 95%..    Physical Exam:  Awake, Alert, Oriented x 3, No new focal Neurologic deficit, Normal Affect  NC/AT, PERRLA, EOMI  Supple neck, no JVD, No cervical lymphadenopathy  Symmetrical chest, Good air entry bilaterally. No rhonchi, wheezes, crackles appreciated  RRR, No gallop, rub or murmur  +ve Bowel sounds x4, Abd soft and non tender, no rebound, guarding or rigidity  No Cyanosis, cludding or edema, No new rash or " bruises    Procedures Performed: No admission procedures for hospital encounter.     Significant Diagnostic Studies: See Full reports for all details  Admission on 07/25/2025   Component Date Value    Sodium 07/25/2025 137     Potassium 07/25/2025 4.4     Chloride 07/25/2025 102     CO2 07/25/2025 26     Glucose 07/25/2025 122 (H)     Blood Urea Nitrogen 07/25/2025 16.0     Creatinine 07/25/2025 0.89     Calcium 07/25/2025 8.6 (L)     Protein Total 07/25/2025 6.9     Albumin 07/25/2025 3.7     Globulin 07/25/2025 3.2     Albumin/Globulin Ratio 07/25/2025 1.2     Bilirubin Total 07/25/2025 0.6     ALP 07/25/2025 73     ALT 07/25/2025 21     AST 07/25/2025 20     eGFR 07/25/2025 >60     Anion Gap 07/25/2025 9.0     BUN/Creatinine Ratio 07/25/2025 18     PT 07/25/2025 13.4     INR 07/25/2025 1.0     PTT 07/25/2025 24.8     QRS Duration 07/25/2025 88     OHS QTC Calculation 07/25/2025 373     WBC 07/25/2025 17.39 (H)     RBC 07/25/2025 4.53 (L)     Hgb 07/25/2025 13.9 (L)     Hct 07/25/2025 42.6     MCV 07/25/2025 94.0     MCH 07/25/2025 30.7     MCHC 07/25/2025 32.6 (L)     RDW 07/25/2025 12.9     Platelet 07/25/2025 206     MPV 07/25/2025 10.3     Neut % 07/25/2025 84.8     Lymph % 07/25/2025 9.4     Mono % 07/25/2025 3.4     Eos % 07/25/2025 1.4     Basophil % 07/25/2025 0.4     Imm Grans % 07/25/2025 0.6     Neut # 07/25/2025 14.74 (H)     Lymph # 07/25/2025 1.63     Mono # 07/25/2025 0.59     Eos # 07/25/2025 0.25     Baso # 07/25/2025 0.07     Imm Gran # 07/25/2025 0.11 (H)     NRBC% 07/25/2025 0.0     Group & Rh 07/25/2025 O POS     Indirect Yoni GEL 07/25/2025 NEG     Specimen Outdate 07/25/2025 07/28/2025 23:59     Sodium 07/26/2025 137     Potassium 07/26/2025 4.7     Chloride 07/26/2025 101     CO2 07/26/2025 28     Glucose 07/26/2025 126 (H)     Blood Urea Nitrogen 07/26/2025 17.8     Creatinine 07/26/2025 0.84     Calcium 07/26/2025 8.4 (L)     Protein Total 07/26/2025 6.2     Albumin 07/26/2025 3.3 (L)      Globulin 07/26/2025 2.9     Albumin/Globulin Ratio 07/26/2025 1.1     Bilirubin Total 07/26/2025 0.7     ALP 07/26/2025 65     ALT 07/26/2025 15     AST 07/26/2025 22     eGFR 07/26/2025 >60     Anion Gap 07/26/2025 8.0     BUN/Creatinine Ratio 07/26/2025 21     Magnesium Level 07/26/2025 1.80     WBC 07/26/2025 11.27     RBC 07/26/2025 3.28 (L)     Hgb 07/26/2025 10.4 (L)     Hct 07/26/2025 30.3 (L)     MCV 07/26/2025 92.4     MCH 07/26/2025 31.7 (H)     MCHC 07/26/2025 34.3     RDW 07/26/2025 12.8     Platelet 07/26/2025 155     MPV 07/26/2025 10.4     Neut % 07/26/2025 77.0     Lymph % 07/26/2025 13.7     Mono % 07/26/2025 8.1     Eos % 07/26/2025 0.4     Basophil % 07/26/2025 0.4     Imm Grans % 07/26/2025 0.4     Neut # 07/26/2025 8.68     Lymph # 07/26/2025 1.54     Mono # 07/26/2025 0.91     Eos # 07/26/2025 0.05     Baso # 07/26/2025 0.04     Imm Gran # 07/26/2025 0.05 (H)     NRBC% 07/26/2025 0.0     WBC 07/27/2025 10.47     RBC 07/27/2025 3.08 (L)     Hgb 07/27/2025 9.6 (L)     Hct 07/27/2025 28.5 (L)     MCV 07/27/2025 92.5     MCH 07/27/2025 31.2 (H)     MCHC 07/27/2025 33.7     RDW 07/27/2025 12.9     Platelet 07/27/2025 140     MPV 07/27/2025 10.7 (H)     Neut % 07/27/2025 71.7     Lymph % 07/27/2025 17.2     Mono % 07/27/2025 9.2     Eos % 07/27/2025 1.0     Basophil % 07/27/2025 0.5     Imm Grans % 07/27/2025 0.4     Neut # 07/27/2025 7.52     Lymph # 07/27/2025 1.80     Mono # 07/27/2025 0.96     Eos # 07/27/2025 0.10     Baso # 07/27/2025 0.05     Imm Gran # 07/27/2025 0.04     NRBC% 07/27/2025 0.0     Vitamin B12 07/26/2025 524     Iron Binding Capacity Un* 07/26/2025 183     Iron Level 07/26/2025 17 (L)     Transferrin 07/26/2025 186     Iron Binding Capacity To* 07/26/2025 200 (L)     Iron Saturation 07/26/2025 9 (L)     Folate Level 07/27/2025 13.4     Ferritin Level 07/26/2025 416.57 (H)     Hgb 07/27/2025 9.7 (L)     WBC 07/28/2025 10.63     RBC 07/28/2025 3.06 (L)     Hgb 07/28/2025 9.5  (L)     Hct 07/28/2025 28.2 (L)     MCV 07/28/2025 92.2     MCH 07/28/2025 31.0     MCHC 07/28/2025 33.7     RDW 07/28/2025 12.8     Platelet 07/28/2025 160     MPV 07/28/2025 11.2 (H)     Neut % 07/28/2025 76.5     Lymph % 07/28/2025 13.5     Mono % 07/28/2025 7.4     Eos % 07/28/2025 1.4     Basophil % 07/28/2025 0.6     Imm Grans % 07/28/2025 0.6     Neut # 07/28/2025 8.13     Lymph # 07/28/2025 1.44     Mono # 07/28/2025 0.79     Eos # 07/28/2025 0.15     Baso # 07/28/2025 0.06     Imm Gran # 07/28/2025 0.06 (H)     NRBC% 07/28/2025 0.0     Hgb 07/29/2025 9.4 (L)     Hct 07/29/2025 28.0 (L)     Hgb 07/30/2025 10.5 (L)     Hct 07/30/2025 31.9 (L)         Microbiology Results (last 7 days)       ** No results found for the last 168 hours. **             X-Ray Elbow Complete Left  Result Date: 7/26/2025  EXAMINATION XR ELBOW COMPLETE 3 VIEW LEFT CLINICAL HISTORY Trauma;  recent fall TECHNIQUE A total of 3 images submitted of the left elbow. COMPARISON None available at the time of initial interpretation. FINDINGS Regional arthritic changes are noted, with marginal osteophytic projections.  Prominent olecranon enthesophyte is also visualized.  No convincing acutely displaced fracture or dislocation is identified. There are no findings indicative of a joint effusion. No aggressive osseous lesion, periarticular erosion, or periosteal reaction is appreciated. The included soft tissues are without acute abnormality. IMPRESSION Arthritic changes without convincing acute abnormality. Electronically signed by: Felipe Bean Date:    07/26/2025 Time:    16:18    X-Ray Femur 2 View Left  Result Date: 7/25/2025  EXAMINATION: XR FEMUR 2 VIEW LEFT CLINICAL HISTORY: post-op; COMPARISON: Earlier today FINDINGS: Two views of the left femur.  Interval placement of intramedullary tootie with screw across the femoral neck.  There is anatomic position and alignment.     As above. Electronically signed by: Tc Enriquez Date:    07/25/2025  Time:    13:56    SURG FL Surgery Fluoro Usage  Result Date: 7/25/2025  See OP Notes for results. IMPRESSION: See OP Notes for results. This procedure was auto-finalized by: Johana Radiologist    X-Ray Hip 2 or 3 views Left with Pelvis when performed  Result Date: 7/25/2025  EXAMINATION: XR HIP WITH PELVIS WHEN PERFORMED 2 OR 3 VIEWS LEFT CLINICAL HISTORY: trauma; TECHNIQUE: AP view of the pelvis and AP and frog leg lateral view of the left hip were performed. COMPARISON: None. FINDINGS: BONE: Intertrochanteric left femoral fracture. SOFT TISSUES: Unremarkable. Electronically signed by: Laurence Flores Date:    07/25/2025 Time:    12:08    X-Ray Chest 1 View  Result Date: 7/25/2025  EXAMINATION: XR CHEST 1 VIEW CLINICAL HISTORY: preop; TECHNIQUE: One view COMPARISON: None available. FINDINGS: Cardiopericardial silhouette is within normal limits.  No acute dense focal or segmental consolidation, congestive process, pleural effusions or pneumothorax.     No acute cardiopulmonary process identified. Electronically signed by: Irving Lilly Date:    07/25/2025 Time:    09:14  - pulls last radiology orders        Medication List        START taking these medications      HYDROcodone-acetaminophen 5-325 mg per tablet  Commonly known as: NORCO  Take 1 tablet by mouth every 6 (six) hours as needed for Pain.     senna-docusate 8.6-50 mg per tablet  Commonly known as: PERICOLACE  Take 1 tablet by mouth 2 (two) times daily.            CONTINUE taking these medications      ascorbic acid (vitamin C) 1000 MG tablet  Commonly known as: VITAMIN C     Fish OiL 1,000 (120-180) mg Cap  Generic drug: omega 3-dha-epa-fish oil     mv-min-folic-K1-lycopen-lutein 518--300 mcg Tab     pravastatin 20 MG tablet  Commonly known as: PRAVACHOL            STOP taking these medications      losartan 50 MG tablet  Commonly known as: COZAAR               Where to Get Your Medications        These medications were sent to [facility]  [facility] - [place], [place] - [address]  [address], [facility] [address]      Phone: [phone]   HYDROcodone-acetaminophen 5-325 mg per tablet  senna-docusate 8.6-50 mg per tablet          Explained in detail to the patient about the discharge plan, medications, and follow-up visits. Pt understands and agrees with the treatment plan  Discharged Condition: stable  Diet: regular  Disposition: TCU/SNF    Medications Per DC med rec  Activities as tolerated  Follow up with your PCP in 2 wks   For further questions contact hospitalist office    Discharge time 33 minutes    For worsening symptoms, chest pain, shortness of breath, increased abdominal pain, high grade fever, stroke or stroke like symptoms, immediately go to the nearest Emergency Room or call 911 as soon as possible.        Gold Zelaya M.D, on 7/31/2025. at 6:22 AM.

## 2025-07-31 NOTE — PRE ADMISSION SCREENING
[facility]    Pre-Admission Patient Screening                    Pre-Screen type:  SNF:  Reason for Admission:    Displaced intertrochanteric left femur fracture  Acute post hemorrhagic anemia    SNF Admission Criteria:    Primary: Rehab Services     Actively treated hospital diagnosis/diagnoses: N/A    Facility Status: Accept     Referring Physician:  Blanco Valladares MD    Admitting Physician:  Blanco Valladares MD    Primary Care Physician:  Pierre Ledesma MD    History         Patient Active Problem List    Diagnosis Date Noted    Tobacco dependency 07/26/2025    Anemia 07/25/2025    Intertrochanteric fracture of left femur, closed, initial encounter 07/25/2025         Previous Specialties/Consulted physicians:      Other: Ortho      Past and Current Medical History    Past Medical History:   Diagnosis Date    Hypertension     Mixed hyperlipidemia            History of Present Illness     69-year-old male with a past medical history of hypertension, tobacco dependence who was walking on wet floor when he slipped and fell.  Denies syncope, head injury, loss of sphincter control, tongue biting.  He was unable to get up after therefore was brought to the emergency room where he was found to have a left intertrochanteric femur fracture.     At baseline the patient lives with his wife and is independent.     Patient lives home alone with his wife with no one else to help at home.  He has steps in his unable to complete them easily with physical therapy as of 07/30/2025.  At this point needs referral to SNF    ASSESSMENT/PLAN   Intertrochanteric proximal femur fracture   ABLA  -status post left intertrochanteric femur fracture with IM nail on 07/25/2025 with Dr. Braden Herring  -weight-bearing as tolerated per Orthopedic surgery  -pain control, bowel regimen as needed   -PT/OT  -Ferrlecit x3, completed 07/29/2025     Primary hypertension   + orthostatics-improving  -continues to have orthostatic  hypotension, discontinue losartan on 07/30/2025  -encourage Darrell sanchez     Tobacco dependence   -1 pack per day, p.r.n. nicotine patch     DVT prophylaxis:  Lovenox      Patient Traveled outside of the [place] in the last 3 months? no     Patient discharged from this LTAC to SNF within the last 45 days? no    Patient discharged from this LTAC to Rehab within the last 27 days? no    Prior residence: home    Prior Post-Acute Services: N/A     Allergies: Review of patient's allergies indicates:  No Known Allergies    Has patient received the current influenza vaccine (Oct 1 - March 31)? Unknown     Has patient received PPD skin test prior to admit? N/A     Code Status: Full Code    Orientation: Time, Place, Person, and Events    Speech: normal     Vital Signs:     Date     Blood Pressure     Pulse     Respiratory Rate     O2 Saturation     Temperature         Bowel/Bladder: continent of bladder and continent of bowel  Bowel/Bladder Appliance: N/A    Dialysis: N/A    Peripheral IV Single Lumen 07/25/25 0336 18 G Left Antecubital (Active)   Site Assessment Clean;Dry;Intact 07/31/25 0000   Line Securement Device Secured with sutureless device 07/30/25 0800   Extremity Assessment Distal to IV No abnormal discoloration;No redness;No swelling;No warmth 07/30/25 1600   Line Status Flushed;Saline locked 07/31/25 0000   Dressing Status Clean;Dry;Intact 07/31/25 0000   Dressing Intervention Integrity maintained 07/31/25 0000   Number of days: 6       Peripheral IV Single Lumen 07/25/25 0800 18 G 1 in Right;Lateral Wrist (Active)   Site Assessment Clean;Dry;Intact 07/31/25 0000   Line Securement Device Secured with sutureless device 07/30/25 0800   Extremity Assessment Distal to IV No abnormal discoloration;No redness;No swelling 07/30/25 1600   Line Status Capped;Saline locked 07/31/25 0000   Dressing Status Clean;Dry;Intact 07/31/25 0000   Dressing Intervention Integrity maintained 07/31/25 0000   Number of days: 6        CBGs/Accuchecks: No     Precautions: Fall    Restraints: No     Isolation Precautions: N/A       Facility-Administered Medications as of 7/31/2025   Medication Dose Route Frequency Provider Last Rate Last Admin    [COMPLETED] 0.9% NaCl infusion  500 mL Intravenous ED 1 Time Vani Garcia MD   Stopped at 07/25/25 0528    [COMPLETED] acetaminophen 1,000 mg/100 mL (10 mg/mL) injection 1,000 mg  1,000 mg Intravenous Once Melvin Guzman  mL/hr at 07/25/25 1321 1,000 mg at 07/25/25 1321    acetaminophen tablet 650 mg  650 mg Oral Q4H PRN Reyes, Thairy G, DO        albuterol-ipratropium 2.5 mg-0.5 mg/3 mL nebulizer solution 3 mL  3 mL Nebulization Q6H PRN Reyes, Thairy G, DO        aluminum-magnesium hydroxide-simethicone 200-200-20 mg/5 mL suspension 30 mL  30 mL Oral QID PRN Reyes, Thairy G, DO        bisacodyL suppository 10 mg  10 mg Rectal Daily PRN Reyes, Thairy G, DO        [COMPLETED] ceFAZolin 2 g  2 g Intravenous Q8H Kaylan Douglas PA-C   2 g at 07/26/25 1134    dextrose 50% injection 12.5 g  12.5 g Intravenous PRN Reyes, Thairy G, DO        dextrose 50% injection 25 g  25 g Intravenous PRN Reyes, Thairy G, DO        enoxaparin injection 40 mg  40 mg Subcutaneous Daily Reyes, Thairy G, DO   40 mg at 07/30/25 1649    [COMPLETED] ferric gluconate (Ferrlecit) 125 mg in 0.9% NaCl 110 mL IVPB  125 mg Intravenous Daily Reyes, Thairy G, DO   Stopped at 07/29/25 1123    glucagon (human recombinant) injection 1 mg  1 mg Intramuscular PRN Reyes, Thairy G, DO        glucose chewable tablet 16 g  16 g Oral PRN Reyes, Thairy G, DO        glucose chewable tablet 24 g  24 g Oral PRN Reyes, Thairy G, DO        melatonin tablet 9 mg  9 mg Oral Nightly PRN Reyes, Thairy G, DO        [COMPLETED] methocarbamoL injection 1,000 mg  1,000 mg Intravenous Once Vani Garcia MD   1,000 mg at 07/25/25 0454    morphine injection 2 mg  2 mg Intravenous Q6H PRN Reyes, Thairy G, DO   2 mg at 07/31/25 0683     "[COMPLETED] morphine injection 4 mg  4 mg Intravenous ED 1 Time Vani Garcia MD   4 mg at 07/25/25 0454    naloxone 0.4 mg/mL injection 0.02 mg  0.02 mg Intravenous PRN Reyes, Thairy G, DO        nicotine 21 mg/24 hr 1 patch  1 patch Transdermal Daily PRN Reyes, Thairy G, DO        ondansetron disintegrating tablet 8 mg  8 mg Oral Q8H PRN Reyes, Thairy G, DO        ondansetron injection 4 mg  4 mg Intravenous Q8H PRN Reyes, Thairy G, DO        oxyCODONE immediate release tablet 5 mg  5 mg Oral Q4H PRN Reyes, Thairy G, DO   5 mg at 07/30/25 2358    polyethylene glycol packet 17 g  17 g Oral TID PRN Reyes, Thairy G, DO   17 g at 07/28/25 2247    pravastatin tablet 20 mg  20 mg Oral Daily Reyes, Thairy G, DO   20 mg at 07/30/25 0801    senna-docusate 8.6-50 mg per tablet 1 tablet  1 tablet Oral BID Reyes, Thairy G, DO   1 tablet at 07/30/25 2237    simethicone chewable tablet 80 mg  1 tablet Oral QID PRN Reyes, Thairy G, DO        [COMPLETED] sodium chloride 0.9% bolus 500 mL 500 mL  500 mL Intravenous ED 1 Time Vani Garcia MD   Stopped at 07/25/25 0615    sodium chloride 0.9% flush 10 mL  10 mL Intravenous PRN Reyes, Thairy G, DO        tiZANidine tablet 2 mg  2 mg Oral Q8H PRN Reyes, Thairy G, DO         Outpatient Medications as of 7/31/2025   Medication Sig Dispense Refill    HYDROcodone-acetaminophen (NORCO) 5-325 mg per tablet Take 1 tablet by mouth every 6 (six) hours as needed for Pain. 28 tablet 0    senna-docusate (PERICOLACE) 8.6-50 mg per tablet Take 1 tablet by mouth 2 (two) times daily. 60 tablet 0        Cardiovascular:    Cardiovascular Review: Within definable limits (WDL)    Telemetry: No    Rhythm: N/A    AICD: No          Nutrition:      Ht Readings from Last 3 Encounters:   07/28/25 6' 2" (1.88 m)     Wt Readings from Last 1 Encounters:   07/28/25 0730 98.9 kg (218 lb)   07/25/25 0307 98.9 kg (218 lb)       Feeding Status:   Current Diet: regular   Supplements:N/A   Tube Feeding: " N/A   Flushes: N/A      Integumentary:    Integumentary: Within definable limits (WDL)              Wound 07/25/25 1208 Incision Left lateral Thigh (Active)   07/25/25 1208 Thigh   Present on Original Admission: N   Primary Wound Type: Incision   Side: Left   Orientation: lateral   Wound Approximate Age at First Assessment (Weeks):    Wound Number:    Is this injury device related?:    Incision Type:    Closure Method: Staples   Wound Description (Comments):    Type:    Additional Comments: xeroform, island dressing   Ankle-Brachial Index:    Pulses:    Removal Indication and Assessment:    Wound Outcome:    Wound Image   07/27/25 1234   Dressing Appearance Clean;Dry;Intact 07/30/25 2113   Drainage Amount Scant 07/30/25 2113   Drainage Characteristics/Odor Yellow;Tan;No odor 07/30/25 2113   Appearance Staples intact;Pigmentation consistent with skin tone 07/30/25 2113   Wound Edges Approximated 07/30/25 2113   Care Cleansed with:;Sterile normal saline 07/28/25 1200   Dressing Changed;Gauze;Absorptive Pad;Tape;Island/border 07/30/25 2113   Number of days: 6            Wound 07/27/25 Skin Tear Left lower Back (Active)   07/27/25  Back   Present on Original Admission:    Primary Wound Type: Skin tear   Side: Left   Orientation: lower   Wound Approximate Age at First Assessment (Weeks):    Wound Number:    Is this injury device related?:    Incision Type:    Closure Method:    Wound Description (Comments):    Type:    Additional Comments:    Ankle-Brachial Index:    Pulses:    Removal Indication and Assessment:    Wound Outcome:    Wound Image   07/27/25 1234   Dressing Appearance Open to air 07/30/25 0800   Drainage Amount Scant 07/29/25 0800   Drainage Characteristics/Odor Serous 07/29/25 0800   Appearance Dressing in place, unable to visualize 07/29/25 0800   Tissue loss description Partial thickness 07/27/25 1234   Black (%), Wound Tissue Color 0 % 07/27/25 1234   Red (%), Wound Tissue Color 100 % 07/27/25 1234    Yellow (%), Wound Tissue Color 0 % 07/27/25 1234   Periwound Area Dry;Intact 07/27/25 1234   Care Cleansed with:;Sterile normal saline 07/27/25 1234   Dressing Foam 07/27/25 2124   Number of days: 4         Musculoskeletal:    Transfer assist: Stand-by Assistance    Weight Bearing Status: As Tolerated    Comments: N/A    ADL Assist: Stand-by Assistance    Special Equipment: walker    Radiology:    Radiology (Last 168 hours)               07/26 1609 X-Ray Elbow Complete Left       07/25 1324 X-Ray Femur 2 View Left       07/25 1248 SURG FL Surgery Fluoro Usage       07/25 0437 X-Ray Chest 1 View       07/25 0436 X-Ray Hip 2 or 3 views Left with Pelvis when performed                X-Ray Elbow Complete Left  EXAMINATION  XR ELBOW COMPLETE 3 VIEW LEFT    CLINICAL HISTORY  Trauma;  recent fall    TECHNIQUE  A total of 3 images submitted of the left elbow.    COMPARISON  None available at the time of initial interpretation.    FINDINGS  Regional arthritic changes are noted, with marginal osteophytic projections.  Prominent olecranon enthesophyte is also visualized.  No convincing acutely displaced fracture or dislocation is identified. There are no findings indicative of a joint effusion. No aggressive osseous lesion, periarticular erosion, or periosteal reaction is appreciated.    The included soft tissues are without acute abnormality.    IMPRESSION  Arthritic changes without convincing acute abnormality.    Electronically signed by: Fleipe Bean  Date:    07/26/2025  Time:    16:18      Lab/Cultures:    Blood Urea Nitrogen   Date Value Ref Range Status   07/26/2025 17.8 8.4 - 25.7 mg/dL Final   07/25/2025 16.0 8.4 - 25.7 mg/dL Final     Creatinine   Date Value Ref Range Status   07/26/2025 0.84 0.72 - 1.25 mg/dL Final   07/25/2025 0.89 0.72 - 1.25 mg/dL Final     WBC   Date Value Ref Range Status   07/28/2025 10.63 4.50 - 11.50 x10(3)/mcL Final   07/27/2025 10.47 4.50 - 11.50 x10(3)/mcL Final      No results found  "for: "CULTBLD", "LABBLOO", "CULBFAERO", "CULBFANAERO", "CULTGAS", "CULTMRSA", "CULTSTOOL", "THROATCULTA", "CULTURESP", "LABURIN", "URINESENSE", "CULWND", "TCULT", "RESPIRATORYC", "CDIFFICILEAN", "CDIFFTOX"  No results for input(s): "PH", "PCO2", "PO2", "HCO3", "POCSATURATED", "BE" in the last 72 hours.       "

## 2025-08-15 PROBLEM — S72.142A INTERTROCHANTERIC FRACTURE OF LEFT FEMUR, CLOSED, INITIAL ENCOUNTER: Status: RESOLVED | Noted: 2025-07-25 | Resolved: 2025-08-15

## 2025-08-20 ENCOUNTER — HOSPITAL ENCOUNTER (OUTPATIENT)
Dept: RADIOLOGY | Facility: CLINIC | Age: 70
Discharge: HOME | End: 2025-08-20
Attending: PHYSICIAN ASSISTANT
Payer: MEDICARE

## 2025-08-20 ENCOUNTER — OFFICE VISIT (OUTPATIENT)
Dept: ORTHOPEDICS | Facility: CLINIC | Age: 70
End: 2025-08-20
Payer: MEDICARE

## 2025-08-20 VITALS — BODY MASS INDEX: 25.15 KG/M2 | HEIGHT: 74 IN | WEIGHT: 196 LBS

## 2025-08-20 DIAGNOSIS — S72.142D CLOSED DISPLACED INTERTROCHANTERIC FRACTURE OF LEFT FEMUR WITH ROUTINE HEALING, SUBSEQUENT ENCOUNTER: ICD-10-CM

## 2025-08-20 DIAGNOSIS — S72.142D CLOSED DISPLACED INTERTROCHANTERIC FRACTURE OF LEFT FEMUR WITH ROUTINE HEALING, SUBSEQUENT ENCOUNTER: Primary | ICD-10-CM

## 2025-08-20 PROCEDURE — 99024 POSTOP FOLLOW-UP VISIT: CPT | Mod: POP,,, | Performed by: PHYSICIAN ASSISTANT

## 2025-08-20 PROCEDURE — 73552 X-RAY EXAM OF FEMUR 2/>: CPT | Mod: LT,,, | Performed by: PHYSICIAN ASSISTANT

## 2025-08-20 RX ORDER — VITAMIN E 268 MG
1 CAPSULE ORAL
COMMUNITY

## 2025-08-20 RX ORDER — IBUPROFEN 100 MG/5ML
SUSPENSION, ORAL (FINAL DOSE FORM) ORAL
COMMUNITY

## (undated) DEVICE — TAPE SILK 3IN

## (undated) DEVICE — DRAPE IOBAN 2 STERI

## (undated) DEVICE — ELECTRODE REM POLYHESIVE II

## (undated) DEVICE — GLOVE PROTEXIS BLUE LATEX 9

## (undated) DEVICE — COVER TABLE HVY DTY 60X90IN

## (undated) DEVICE — DRESSING XEROFORM 5X9IN

## (undated) DEVICE — DRESSING TELFA + BARR 4X6IN

## (undated) DEVICE — Device

## (undated) DEVICE — SPONGE COTTON TRAY 4X4IN

## (undated) DEVICE — BLADE SURG CARBON STEEL #10

## (undated) DEVICE — BIT DRILL 4.2MM 3 FLUTD 145MM

## (undated) DEVICE — GLOVE SIGNATURE MICRO LTX 6

## (undated) DEVICE — COVER FULLGUARD SHOE HIGH-TOP

## (undated) DEVICE — STAPLER SKIN PROXIMATE WIDE

## (undated) DEVICE — WIRE GUIDE 3.2MM 400MM
Type: IMPLANTABLE DEVICE | Site: HIP | Status: NON-FUNCTIONAL
Removed: 2025-07-25

## (undated) DEVICE — GUIDEWIRE ORTHOPEDIC 220X1.6MM
Type: IMPLANTABLE DEVICE | Site: HIP | Status: NON-FUNCTIONAL
Removed: 2025-07-25

## (undated) DEVICE — GLOVE PROTEXIS HYDROGEL SZ9

## (undated) DEVICE — DRAPE C-ARMOR EQUIPMENT COVER

## (undated) DEVICE — GOWN SMARTGOWN 3XL XLONG

## (undated) DEVICE — BIT DRILL CANNULATED 3MM

## (undated) DEVICE — APPLICATOR CHLORAPREP ORN 26ML

## (undated) DEVICE — GLOVE PROTEXIS NEU-THERA SZ6